# Patient Record
Sex: FEMALE | Race: BLACK OR AFRICAN AMERICAN | Employment: FULL TIME | URBAN - METROPOLITAN AREA
[De-identification: names, ages, dates, MRNs, and addresses within clinical notes are randomized per-mention and may not be internally consistent; named-entity substitution may affect disease eponyms.]

---

## 2017-05-18 PROBLEM — Z97.5 IUD (INTRAUTERINE DEVICE) IN PLACE: Status: ACTIVE | Noted: 2017-05-18

## 2017-05-18 PROBLEM — N83.201 RIGHT OVARIAN CYST: Status: ACTIVE | Noted: 2017-05-18

## 2017-11-10 ENCOUNTER — HOSPITAL ENCOUNTER (OUTPATIENT)
Dept: MRI IMAGING | Age: 22
Discharge: HOME | End: 2017-11-10
Attending: INTERNAL MEDICINE
Payer: COMMERCIAL

## 2017-11-10 DIAGNOSIS — G44.89 OTHER HEADACHE SYNDROME: ICD-10-CM

## 2017-11-10 PROCEDURE — A9577 INJ MULTIHANCE: HCPCS | Performed by: INTERNAL MEDICINE

## 2017-11-10 PROCEDURE — 74011250636 HC RX REV CODE- 250/636: Performed by: INTERNAL MEDICINE

## 2017-11-10 PROCEDURE — 70553 MRI BRAIN STEM W/O & W/DYE: CPT

## 2017-11-10 RX ORDER — SODIUM CHLORIDE 0.9 % (FLUSH) 0.9 %
10 SYRINGE (ML) INJECTION
Status: COMPLETED | OUTPATIENT
Start: 2017-11-10 | End: 2017-11-10

## 2017-11-10 RX ADMIN — GADOBENATE DIMEGLUMINE 14 ML: 529 INJECTION, SOLUTION INTRAVENOUS at 18:28

## 2017-11-10 RX ADMIN — Medication 10 ML: at 18:28

## 2018-05-21 PROBLEM — Z97.5 IUD (INTRAUTERINE DEVICE) IN PLACE: Status: RESOLVED | Noted: 2017-05-18 | Resolved: 2018-05-21

## 2018-05-21 PROCEDURE — 88142 CYTOPATH C/V THIN LAYER: CPT | Performed by: OBSTETRICS & GYNECOLOGY

## 2018-05-23 ENCOUNTER — HOSPITAL ENCOUNTER (OUTPATIENT)
Dept: LAB | Age: 23
Discharge: HOME | End: 2018-05-23

## 2018-07-18 PROBLEM — N83.201 RIGHT OVARIAN CYST: Status: RESOLVED | Noted: 2017-05-18 | Resolved: 2018-07-18

## 2018-08-23 ENCOUNTER — HOSPITAL ENCOUNTER (OUTPATIENT)
Dept: CT IMAGING | Age: 23
Discharge: HOME | End: 2018-08-23
Attending: INTERNAL MEDICINE
Payer: COMMERCIAL

## 2018-08-23 DIAGNOSIS — R53.83 FATIGUE, UNSPECIFIED TYPE: ICD-10-CM

## 2018-08-23 DIAGNOSIS — D69.6 THROMBOCYTOPENIA (HCC): ICD-10-CM

## 2018-08-23 PROBLEM — D50.0 IRON DEFICIENCY ANEMIA DUE TO CHRONIC BLOOD LOSS: Status: ACTIVE | Noted: 2018-08-23

## 2018-08-23 PROBLEM — N93.9 EXCESSIVE VAGINAL BLEEDING: Status: ACTIVE | Noted: 2018-08-23

## 2018-08-23 PROCEDURE — 74011000258 HC RX REV CODE- 258: Performed by: INTERNAL MEDICINE

## 2018-08-23 PROCEDURE — 74011636320 HC RX REV CODE- 636/320: Performed by: INTERNAL MEDICINE

## 2018-08-23 PROCEDURE — 74177 CT ABD & PELVIS W/CONTRAST: CPT

## 2018-08-23 RX ORDER — SODIUM CHLORIDE 0.9 % (FLUSH) 0.9 %
10 SYRINGE (ML) INJECTION
Status: COMPLETED | OUTPATIENT
Start: 2018-08-23 | End: 2018-08-23

## 2018-08-23 RX ADMIN — Medication 10 ML: at 09:50

## 2018-08-23 RX ADMIN — IOPAMIDOL 100 ML: 755 INJECTION, SOLUTION INTRAVENOUS at 09:50

## 2018-08-23 RX ADMIN — SODIUM CHLORIDE 100 ML: 900 INJECTION, SOLUTION INTRAVENOUS at 09:50

## 2018-08-23 RX ADMIN — DIATRIZOATE MEGLUMINE AND DIATRIZOATE SODIUM 15 ML: 660; 100 LIQUID ORAL; RECTAL at 09:50

## 2018-09-07 LAB
ALBUMIN SERPL-MCNC: 4.6 G/DL (ref 3.5–5)
ALBUMIN/GLOB SERPL: 1.2 {RATIO} (ref 1.2–3.5)
ALP SERPL-CCNC: 50 U/L (ref 50–136)
ALT SERPL-CCNC: 19 U/L (ref 12–65)
ANION GAP SERPL CALC-SCNC: 7 MMOL/L (ref 7–16)
AST SERPL-CCNC: 17 U/L (ref 15–37)
BASOPHILS # BLD: 0 K/UL (ref 0–0.2)
BASOPHILS NFR BLD: 0 % (ref 0–2)
BILIRUB SERPL-MCNC: 0.2 MG/DL (ref 0.2–1.1)
BUN SERPL-MCNC: 14 MG/DL (ref 6–23)
CALCIUM SERPL-MCNC: 9.9 MG/DL (ref 8.3–10.4)
CHLORIDE SERPL-SCNC: 104 MMOL/L (ref 98–107)
CO2 SERPL-SCNC: 29 MMOL/L (ref 21–32)
CREAT SERPL-MCNC: 0.96 MG/DL (ref 0.6–1)
DIFFERENTIAL METHOD BLD: ABNORMAL
EOSINOPHIL # BLD: 0.1 K/UL (ref 0–0.8)
EOSINOPHIL NFR BLD: 1 % (ref 0.5–7.8)
ERYTHROCYTE [DISTWIDTH] IN BLOOD BY AUTOMATED COUNT: 13.8 %
GLOBULIN SER CALC-MCNC: 3.8 G/DL (ref 2.3–3.5)
GLUCOSE SERPL-MCNC: 91 MG/DL (ref 65–100)
HCT VFR BLD AUTO: 41.2 % (ref 35.8–46.3)
HGB BLD-MCNC: 12.5 G/DL (ref 11.7–15.4)
IMM GRANULOCYTES # BLD: 0 K/UL (ref 0–0.5)
IMM GRANULOCYTES NFR BLD AUTO: 0 % (ref 0–5)
LYMPHOCYTES # BLD: 2.9 K/UL (ref 0.5–4.6)
LYMPHOCYTES NFR BLD: 35 % (ref 13–44)
MCH RBC QN AUTO: 23.7 PG (ref 26.1–32.9)
MCHC RBC AUTO-ENTMCNC: 30.3 G/DL (ref 31.4–35)
MCV RBC AUTO: 78 FL (ref 79.6–97.8)
MONOCYTES # BLD: 0.6 K/UL (ref 0.1–1.3)
MONOCYTES NFR BLD: 7 % (ref 4–12)
NEUTS SEG # BLD: 4.9 K/UL (ref 1.7–8.2)
NEUTS SEG NFR BLD: 58 % (ref 43–78)
NRBC # BLD: 0 K/UL (ref 0–0.2)
PLATELET # BLD AUTO: 136 K/UL (ref 150–450)
PMV BLD AUTO: 14 FL (ref 9.4–12.3)
POTASSIUM SERPL-SCNC: 4.2 MMOL/L (ref 3.5–5.1)
PROT SERPL-MCNC: 8.4 G/DL (ref 6.3–8.2)
RBC # BLD AUTO: 5.28 M/UL (ref 4.05–5.2)
SODIUM SERPL-SCNC: 140 MMOL/L (ref 136–145)
WBC # BLD AUTO: 8.5 K/UL (ref 4.3–11.1)

## 2018-09-07 PROCEDURE — 85025 COMPLETE CBC W/AUTO DIFF WBC: CPT

## 2018-09-07 PROCEDURE — 81025 URINE PREGNANCY TEST: CPT

## 2018-09-07 PROCEDURE — 93005 ELECTROCARDIOGRAM TRACING: CPT | Performed by: STUDENT IN AN ORGANIZED HEALTH CARE EDUCATION/TRAINING PROGRAM

## 2018-09-07 PROCEDURE — 81003 URINALYSIS AUTO W/O SCOPE: CPT | Performed by: EMERGENCY MEDICINE

## 2018-09-07 PROCEDURE — 99284 EMERGENCY DEPT VISIT MOD MDM: CPT | Performed by: EMERGENCY MEDICINE

## 2018-09-07 PROCEDURE — 80053 COMPREHEN METABOLIC PANEL: CPT

## 2018-09-08 ENCOUNTER — HOSPITAL ENCOUNTER (EMERGENCY)
Age: 23
Discharge: HOME | End: 2018-09-08
Attending: EMERGENCY MEDICINE
Payer: COMMERCIAL

## 2018-09-08 VITALS
OXYGEN SATURATION: 100 % | HEART RATE: 69 BPM | BODY MASS INDEX: 24.29 KG/M2 | SYSTOLIC BLOOD PRESSURE: 120 MMHG | TEMPERATURE: 98.3 F | DIASTOLIC BLOOD PRESSURE: 74 MMHG | RESPIRATION RATE: 12 BRPM | WEIGHT: 132 LBS | HEIGHT: 62 IN

## 2018-09-08 DIAGNOSIS — R42 DIZZINESS: Primary | ICD-10-CM

## 2018-09-08 LAB — HCG UR QL: NEGATIVE

## 2018-09-08 RX ORDER — MECLIZINE HYDROCHLORIDE 25 MG/1
25 TABLET ORAL
Qty: 30 TAB | Refills: 0 | Status: SHIPPED | OUTPATIENT
Start: 2018-09-08 | End: 2018-09-18

## 2018-09-08 NOTE — DISCHARGE INSTRUCTIONS
Dizziness: Care Instructions  Your Care Instructions  Dizziness is the feeling of unsteadiness or fuzziness in your head. It is different than having vertigo, which is a feeling that the room is spinning or that you are moving or falling. It is also different from lightheadedness, which is the feeling that you are about to faint. It can be hard to know what causes dizziness. Some people feel dizzy when they have migraine headaches. Sometimes bouts of flu can make you feel dizzy. Some medical conditions, such as heart problems or high blood pressure, can make you feel dizzy. Many medicines can cause dizziness, including medicines for high blood pressure, pain, or anxiety. If a medicine causes your symptoms, your doctor may recommend that you stop or change the medicine. If it is a problem with your heart, you may need medicine to help your heart work better. If there is no clear reason for your symptoms, your doctor may suggest watching and waiting for a while to see if the dizziness goes away on its own. Follow-up care is a key part of your treatment and safety. Be sure to make and go to all appointments, and call your doctor if you are having problems. It's also a good idea to know your test results and keep a list of the medicines you take. How can you care for yourself at home? · If your doctor recommends or prescribes medicine, take it exactly as directed. Call your doctor if you think you are having a problem with your medicine. · Do not drive while you feel dizzy. · Try to prevent falls. Steps you can take include:  ¨ Using nonskid mats, adding grab bars near the tub, and using night-lights. ¨ Clearing your home so that walkways are free of anything you might trip on. ¨ Letting family and friends know that you have been feeling dizzy. This will help them know how to help you. When should you call for help? Call 911 anytime you think you may need emergency care.  For example, call if:    · You passed out (lost consciousness).     · You have dizziness along with symptoms of a heart attack. These may include:  ¨ Chest pain or pressure, or a strange feeling in the chest.  ¨ Sweating. ¨ Shortness of breath. ¨ Nausea or vomiting. ¨ Pain, pressure, or a strange feeling in the back, neck, jaw, or upper belly or in one or both shoulders or arms. ¨ Lightheadedness or sudden weakness. ¨ A fast or irregular heartbeat.     · You have symptoms of a stroke. These may include:  ¨ Sudden numbness, tingling, weakness, or loss of movement in your face, arm, or leg, especially on only one side of your body. ¨ Sudden vision changes. ¨ Sudden trouble speaking. ¨ Sudden confusion or trouble understanding simple statements. ¨ Sudden problems with walking or balance. ¨ A sudden, severe headache that is different from past headaches.    Call your doctor now or seek immediate medical care if:    · You feel dizzy and have a fever, headache, or ringing in your ears.     · You have new or increased nausea and vomiting.     · Your dizziness does not go away or comes back.    Watch closely for changes in your health, and be sure to contact your doctor if:    · You do not get better as expected. Where can you learn more? Go to http://malick-kendra.info/. Enter C823 in the search box to learn more about \"Dizziness: Care Instructions. \"  Current as of: November 20, 2017  Content Version: 11.7  © 0501-5345 [employer]. Care instructions adapted under license by [employer] (which disclaims liability or warranty for this information). If you have questions about a medical condition or this instruction, always ask your healthcare professional. [place] [address] any warranty or liability for your use of this information.

## 2018-09-08 NOTE — ED PROVIDER NOTES
HPI Comments: 79-year-old white female presents to the emergency department complaining of vertiginous/vertigo symptoms over the last 2 months, often associated with movement, but also occasionally feeling out of it. States she had an MRI at the beginning of the year for headaches which was negative. Denies paralysis, paresthesias, possible bladder function. Denies headaches. Denies visual changes. Patient is a 24 y.o. female presenting with dizziness. The history is provided by the patient and a friend. Dizziness This is a recurrent problem. The current episode started more than 1 week ago. The problem has not changed since onset. There was no focality noted. Primary symptoms include loss of balance. Pertinent negatives include no focal weakness, no loss of sensation, no slurred speech, no speech difficulty, no memory loss, no movement disorder, no agitation, no visual change, no auditory change, no mental status change, no unresponsiveness and no disorientation. There has been no fever. Pertinent negatives include no shortness of breath, no chest pain, no vomiting, no altered mental status, no confusion, no headaches, no choking, no nausea, no bowel incontinence and no bladder incontinence. There were no medications administered prior to arrival. Associated medical issues do not include trauma, mood changes, bleeding disorder, seizures, dementia, CVA or clotting disorder. Past Medical History:  
Diagnosis Date  Anemia affecting pregnancy in second trimester 2015  Iron deficiency anemia  Mild or unspecified pre-eclampsia, antepartum 2015  Ovarian cyst   
 
 
Past Surgical History:  
Procedure Laterality Date  HX  SECTION  2015  HX WISDOM TEETH EXTRACTION Family History:  
Problem Relation Age of Onset  Other Mother   
  anemia  No Known Problems Father  No Known Problems Sister  No Known Problems Brother  Breast Cancer Maternal Aunt  Diabetes Maternal Aunt  No Known Problems Maternal Uncle  No Known Problems Paternal Aunt  No Known Problems Paternal Uncle  No Known Problems Maternal Grandmother  No Known Problems Maternal Grandfather  No Known Problems Paternal Grandmother  No Known Problems Paternal Grandfather Social History Social History  Marital status: SINGLE Spouse name: N/A  
 Number of children: N/A  
 Years of education: N/A Occupational History  Not on file. Social History Main Topics  Smoking status: Never Smoker  Smokeless tobacco: Never Used  Alcohol use No  
   Comment: occ  Drug use: No  
 Sexual activity: Yes  
  Partners: Male Birth control/ protection: None Other Topics Concern  Not on file Social History Narrative ALLERGIES: Review of patient's allergies indicates no known allergies. Review of Systems Constitutional: Negative for chills and fever. Respiratory: Negative for choking and shortness of breath. Cardiovascular: Negative for chest pain. Gastrointestinal: Negative for bowel incontinence, nausea and vomiting. Genitourinary: Negative for bladder incontinence. Neurological: Positive for dizziness and loss of balance. Negative for tremors, focal weakness, speech difficulty, weakness, light-headedness and headaches. Psychiatric/Behavioral: Negative for agitation, confusion and memory loss. All other systems reviewed and are negative. Vitals:  
 09/07/18 2252 BP: 123/72 Pulse: 67 Resp: 17 Temp: 98 °F (36.7 °C) SpO2: 100% Weight: 59.9 kg (132 lb) Height: 5' 2\" (1.575 m) Physical Exam  
Constitutional: She is oriented to person, place, and time. She appears well-developed and well-nourished. No distress. HENT:  
Head: Normocephalic and atraumatic.   
Right Ear: Tympanic membrane and external ear normal.  
 Left Ear: Tympanic membrane and external ear normal.  
Mouth/Throat: Oropharynx is clear and moist.  
Eyes: Conjunctivae and EOM are normal. Pupils are equal, round, and reactive to light. Neck: Normal range of motion. Neck supple. No tracheal deviation present. Cardiovascular: Normal rate, regular rhythm, normal heart sounds and intact distal pulses. Exam reveals no gallop and no friction rub. No murmur heard. Pulmonary/Chest: Effort normal and breath sounds normal. No respiratory distress. She has no wheezes. Abdominal: Soft. Bowel sounds are normal. She exhibits no distension and no mass. There is no hepatosplenomegaly. There is no tenderness. There is no rebound and no guarding. Musculoskeletal: Normal range of motion. She exhibits no edema. Lymphadenopathy:  
  She has no cervical adenopathy. Neurological: She is alert and oriented to person, place, and time. She has normal strength. She displays normal reflexes. No cranial nerve deficit or sensory deficit. Coordination and gait normal.  
Negative pronator drift, normal finger-nose. Negative DixHallpike Skin: Skin is warm and dry. No rash noted. She is not diaphoretic. No erythema. Psychiatric: She has a normal mood and affect. Her speech is normal.  
Nursing note and vitals reviewed. MDM Number of Diagnoses or Management Options Dizziness: new and requires workup Amount and/or Complexity of Data Reviewed Clinical lab tests: reviewed and ordered Review and summarize past medical records: yes Risk of Complications, Morbidity, and/or Mortality Presenting problems: moderate Diagnostic procedures: minimal 
Management options: moderate Patient Progress Patient progress: stable ED Course Procedures Results Reviewed: 
 
 
Recent Results (from the past 24 hour(s)) EKG, 12 LEAD, INITIAL Collection Time: 09/07/18 11:07 PM  
Result Value Ref Range  Ventricular Rate 68 BPM  
 Atrial Rate 68 BPM  
 P-R Interval 134 ms QRS Duration 80 ms  
 Q-T Interval 396 ms QTC Calculation (Bezet) 421 ms Calculated P Axis 25 degrees Calculated R Axis 80 degrees Calculated T Axis 61 degrees Diagnosis Normal sinus rhythm Normal ECG No previous ECGs available CBC WITH AUTOMATED DIFF Collection Time: 09/07/18 11:18 PM  
Result Value Ref Range WBC 8.5 4.3 - 11.1 K/uL  
 RBC 5.28 (H) 4.05 - 5.2 M/uL  
 HGB 12.5 11.7 - 15.4 g/dL HCT 41.2 35.8 - 46.3 % MCV 78.0 (L) 79.6 - 97.8 FL  
 MCH 23.7 (L) 26.1 - 32.9 PG  
 MCHC 30.3 (L) 31.4 - 35.0 g/dL  
 RDW 13.8 % PLATELET 220 (L) 158 - 450 K/uL MPV 14.0 (H) 9.4 - 12.3 FL ABSOLUTE NRBC 0.00 0.0 - 0.2 K/uL  
 DF AUTOMATED NEUTROPHILS 58 43 - 78 % LYMPHOCYTES 35 13 - 44 % MONOCYTES 7 4.0 - 12.0 % EOSINOPHILS 1 0.5 - 7.8 % BASOPHILS 0 0.0 - 2.0 % IMMATURE GRANULOCYTES 0 0.0 - 5.0 %  
 ABS. NEUTROPHILS 4.9 1.7 - 8.2 K/UL  
 ABS. LYMPHOCYTES 2.9 0.5 - 4.6 K/UL  
 ABS. MONOCYTES 0.6 0.1 - 1.3 K/UL  
 ABS. EOSINOPHILS 0.1 0.0 - 0.8 K/UL  
 ABS. BASOPHILS 0.0 0.0 - 0.2 K/UL  
 ABS. IMM. GRANS. 0.0 0.0 - 0.5 K/UL METABOLIC PANEL, COMPREHENSIVE Collection Time: 09/07/18 11:18 PM  
Result Value Ref Range Sodium 140 136 - 145 mmol/L Potassium 4.2 3.5 - 5.1 mmol/L Chloride 104 98 - 107 mmol/L  
 CO2 29 21 - 32 mmol/L Anion gap 7 7 - 16 mmol/L Glucose 91 65 - 100 mg/dL BUN 14 6 - 23 MG/DL Creatinine 0.96 0.6 - 1.0 MG/DL  
 GFR est AA >60 >60 ml/min/1.73m2 GFR est non-AA >60 >60 ml/min/1.73m2 Calcium 9.9 8.3 - 10.4 MG/DL Bilirubin, total 0.2 0.2 - 1.1 MG/DL  
 ALT (SGPT) 19 12 - 65 U/L  
 AST (SGOT) 17 15 - 37 U/L Alk. phosphatase 50 50 - 136 U/L Protein, total 8.4 (H) 6.3 - 8.2 g/dL Albumin 4.6 3.5 - 5.0 g/dL Globulin 3.8 (H) 2.3 - 3.5 g/dL A-G Ratio 1.2 1.2 - 3.5 HCG URINE, QL. - POC Collection Time: 09/07/18 11:58 PM  
Result Value Ref Range Pregnancy test,urine (POC) NEGATIVE  NEG    
 
 
I discussed the results of all labs, procedures, radiographs, and treatments with the patient and available family. Treatment plan is agreed upon and the patient is ready for discharge. All voiced understanding of the discharge plan and medication instructions or changes as appropriate. Questions about treatment in the ED were answered. All were encouraged to return should symptoms worsen or new problems develop.

## 2018-09-08 NOTE — ED TRIAGE NOTES
Pt complains of dizziness and lightheadedness. States Zuleyka I be dizzy and lightheaded every day, but it's not as bad as it is now. \" Reports history of iron deficiency and states she takes iron vitamins. Pt also complains of urinary frequency and diarrhea. States she takes OTC iron supplements instead of her prescribed iron pills because \"they make me sick. \"

## 2018-09-08 NOTE — ED NOTES
I have reviewed discharge instructions with the patient. The patient verbalized understanding. Patient left ED via Discharge Method: ambulatory to Home with family / friend. Opportunity for questions and clarification provided. Patient given 1 scripts. To continue your aftercare when you leave the hospital, you may receive an automated call from our care team to check in on how you are doing. This is a free service and part of our promise to provide the best care and service to meet your aftercare needs.  If you have questions, or wish to unsubscribe from this service please call [phone]. Thank you for Choosing our [facility] Emergency Department.

## 2018-09-09 LAB
ATRIAL RATE: 68 BPM
CALCULATED P AXIS, ECG09: 25 DEGREES
CALCULATED R AXIS, ECG10: 80 DEGREES
CALCULATED T AXIS, ECG11: 61 DEGREES
DIAGNOSIS, 93000: NORMAL
P-R INTERVAL, ECG05: 134 MS
Q-T INTERVAL, ECG07: 396 MS
QRS DURATION, ECG06: 80 MS
QTC CALCULATION (BEZET), ECG08: 421 MS
VENTRICULAR RATE, ECG03: 68 BPM

## 2018-11-06 ENCOUNTER — HOSPITAL ENCOUNTER (EMERGENCY)
Age: 23
Discharge: HOME | End: 2018-11-06
Attending: EMERGENCY MEDICINE
Payer: COMMERCIAL

## 2018-11-06 ENCOUNTER — APPOINTMENT (OUTPATIENT)
Dept: GENERAL RADIOLOGY | Age: 23
End: 2018-11-06
Attending: EMERGENCY MEDICINE
Payer: COMMERCIAL

## 2018-11-06 VITALS
HEART RATE: 76 BPM | HEIGHT: 62 IN | TEMPERATURE: 98.1 F | RESPIRATION RATE: 14 BRPM | WEIGHT: 141 LBS | OXYGEN SATURATION: 100 % | DIASTOLIC BLOOD PRESSURE: 55 MMHG | BODY MASS INDEX: 25.95 KG/M2 | SYSTOLIC BLOOD PRESSURE: 109 MMHG

## 2018-11-06 DIAGNOSIS — B34.9 VIRAL SYNDROME: Primary | ICD-10-CM

## 2018-11-06 DIAGNOSIS — R42 LIGHTHEADEDNESS: ICD-10-CM

## 2018-11-06 LAB
ALBUMIN SERPL-MCNC: 3.7 G/DL (ref 3.5–5)
ALBUMIN/GLOB SERPL: 1 {RATIO}
ALP SERPL-CCNC: 39 U/L (ref 50–136)
ALT SERPL-CCNC: 24 U/L (ref 12–65)
ANION GAP SERPL CALC-SCNC: 7 MMOL/L
AST SERPL-CCNC: 27 U/L (ref 15–37)
BASOPHILS # BLD: 0 K/UL (ref 0–0.2)
BASOPHILS NFR BLD: 1 % (ref 0–2)
BILIRUB SERPL-MCNC: 0.2 MG/DL (ref 0.2–1.1)
BUN SERPL-MCNC: 10 MG/DL (ref 6–23)
CALCIUM SERPL-MCNC: 8.8 MG/DL (ref 8.3–10.4)
CHLORIDE SERPL-SCNC: 106 MMOL/L (ref 98–107)
CO2 SERPL-SCNC: 26 MMOL/L (ref 21–32)
CREAT SERPL-MCNC: 0.97 MG/DL (ref 0.6–1)
DIFFERENTIAL METHOD BLD: ABNORMAL
EOSINOPHIL # BLD: 0.1 K/UL (ref 0–0.8)
EOSINOPHIL NFR BLD: 2 % (ref 0.5–7.8)
ERYTHROCYTE [DISTWIDTH] IN BLOOD BY AUTOMATED COUNT: 13.4 %
GLOBULIN SER CALC-MCNC: 3.6 G/DL (ref 2.3–3.5)
GLUCOSE SERPL-MCNC: 95 MG/DL (ref 65–100)
HCG UR QL: NEGATIVE
HCT VFR BLD AUTO: 40.6 % (ref 35.8–46.3)
HGB BLD-MCNC: 12.2 G/DL (ref 11.7–15.4)
IMM GRANULOCYTES # BLD: 0 K/UL (ref 0–0.5)
IMM GRANULOCYTES NFR BLD AUTO: 0 % (ref 0–5)
LYMPHOCYTES # BLD: 2.7 K/UL (ref 0.5–4.6)
LYMPHOCYTES NFR BLD: 36 % (ref 13–44)
MCH RBC QN AUTO: 23.6 PG (ref 26.1–32.9)
MCHC RBC AUTO-ENTMCNC: 30 G/DL (ref 31.4–35)
MCV RBC AUTO: 78.7 FL (ref 79.6–97.8)
MONOCYTES # BLD: 0.7 K/UL (ref 0.1–1.3)
MONOCYTES NFR BLD: 9 % (ref 4–12)
NEUTS SEG # BLD: 4 K/UL (ref 1.7–8.2)
NEUTS SEG NFR BLD: 53 % (ref 43–78)
NRBC # BLD: 0 K/UL (ref 0–0.2)
PLATELET # BLD AUTO: 148 K/UL (ref 150–450)
PMV BLD AUTO: 13.5 FL (ref 9.4–12.3)
POTASSIUM SERPL-SCNC: 3.8 MMOL/L (ref 3.5–5.1)
PROT SERPL-MCNC: 7.3 G/DL
RBC # BLD AUTO: 5.16 M/UL (ref 4.05–5.2)
SODIUM SERPL-SCNC: 139 MMOL/L (ref 136–145)
WBC # BLD AUTO: 7.5 K/UL (ref 4.3–11.1)

## 2018-11-06 PROCEDURE — 85025 COMPLETE CBC W/AUTO DIFF WBC: CPT

## 2018-11-06 PROCEDURE — 74011250636 HC RX REV CODE- 250/636: Performed by: EMERGENCY MEDICINE

## 2018-11-06 PROCEDURE — 80053 COMPREHEN METABOLIC PANEL: CPT

## 2018-11-06 PROCEDURE — 81003 URINALYSIS AUTO W/O SCOPE: CPT | Performed by: EMERGENCY MEDICINE

## 2018-11-06 PROCEDURE — 81025 URINE PREGNANCY TEST: CPT

## 2018-11-06 PROCEDURE — 96360 HYDRATION IV INFUSION INIT: CPT | Performed by: EMERGENCY MEDICINE

## 2018-11-06 PROCEDURE — 99284 EMERGENCY DEPT VISIT MOD MDM: CPT | Performed by: EMERGENCY MEDICINE

## 2018-11-06 PROCEDURE — 71046 X-RAY EXAM CHEST 2 VIEWS: CPT

## 2018-11-06 RX ADMIN — SODIUM CHLORIDE 1000 ML: 900 INJECTION, SOLUTION INTRAVENOUS at 07:04

## 2018-11-06 NOTE — DISCHARGE INSTRUCTIONS
Lightheadedness or Faintness: Care Instructions  Your Care Instructions  Lightheadedness is a feeling that you are about to faint or \"pass out. \" You do not feel as if you or your surroundings are moving. It is different from vertigo, which is the feeling that you or things around you are spinning or tilting. Lightheadedness usually goes away or gets better when you lie down. If lightheadedness gets worse, it can lead to a fainting spell. It is common to feel lightheaded from time to time. Lightheadedness usually is not caused by a serious problem. It often is caused by a short-lasting drop in blood pressure and blood flow to your head that occurs when you get up too quickly from a seated or lying position. Follow-up care is a key part of your treatment and safety. Be sure to make and go to all appointments, and call your doctor if you are having problems. It's also a good idea to know your test results and keep a list of the medicines you take. How can you care for yourself at home? · Lie down for 1 or 2 minutes when you feel lightheaded. After lying down, sit up slowly and remain sitting for 1 to 2 minutes before slowly standing up. · Avoid movements, positions, or activities that have made you lightheaded in the past.  · Get plenty of rest, especially if you have a cold or flu, which can cause lightheadedness. · Make sure you drink plenty of fluids, especially if you have a fever or have been sweating. · Do not drive or put yourself and others in danger while you feel lightheaded. When should you call for help? Call 911 anytime you think you may need emergency care. For example, call if:    · You have symptoms of a stroke. These may include:  ? Sudden numbness, tingling, weakness, or loss of movement in your face, arm, or leg, especially on only one side of your body. ? Sudden vision changes. ? Sudden trouble speaking. ? Sudden confusion or trouble understanding simple statements.   ? Sudden problems with walking or balance. ? A sudden, severe headache that is different from past headaches.     · You have symptoms of a heart attack. These may include:  ? Chest pain or pressure, or a strange feeling in the chest.  ? Sweating. ? Shortness of breath. ? Nausea or vomiting. ? Pain, pressure, or a strange feeling in the back, neck, jaw, or upper belly or in one or both shoulders or arms. ? Lightheadedness or sudden weakness. ? A fast or irregular heartbeat. After you call 911, the  may tell you to chew 1 adult-strength or 2 to 4 low-dose aspirin. Wait for an ambulance. Do not try to drive yourself.    Watch closely for changes in your health, and be sure to contact your doctor if:    · Your lightheadedness gets worse or does not get better with home care. Where can you learn more? Go to http://malick-kendra.info/. Enter K521 in the search box to learn more about \"Lightheadedness or Faintness: Care Instructions. \"  Current as of: November 20, 2017  Content Version: 11.8  © 7684-8447 [employer]. Care instructions adapted under license by [employer] (which disclaims liability or warranty for this information). If you have questions about a medical condition or this instruction, always ask your healthcare professional. [address] any warranty or liability for your use of this information. Viral Infections: Care Instructions  Your Care Instructions    You don't feel well, but it's not clear what's causing it. You may have a viral infection. Viruses cause many illnesses, such as the common cold, influenza, fever, rashes, and the diarrhea, nausea, and vomiting that are often called \"stomach flu. \" You may wonder if antibiotic medicines could make you feel better. But antibiotics only treat infections caused by bacteria. They don't work on viruses. The good news is that viral infections usually aren't serious.  Most will go away in a few days without medical treatment. In the meantime, there are a few things you can do to make yourself more comfortable. Follow-up care is a key part of your treatment and safety. Be sure to make and go to all appointments, and call your doctor if you are having problems. It's also a good idea to know your test results and keep a list of the medicines you take. How can you care for yourself at home? · Get plenty of rest if you feel tired. · Take an over-the-counter pain medicine if needed, such as acetaminophen (Tylenol), ibuprofen (Advil, Motrin), or naproxen (Aleve). Read and follow all instructions on the label. · Be careful when taking over-the-counter cold or flu medicines and Tylenol at the same time. Many of these medicines have acetaminophen, which is Tylenol. Read the labels to make sure that you are not taking more than the recommended dose. Too much acetaminophen (Tylenol) can be harmful. · Drink plenty of fluids, enough so that your urine is light yellow or clear like water. If you have kidney, heart, or liver disease and have to limit fluids, talk with your doctor before you increase the amount of fluids you drink. · Stay home from work, school, and other public places while you have a fever. When should you call for help? Call 911 anytime you think you may need emergency care. For example, call if:    · You have severe trouble breathing.     · You passed out (lost consciousness).    Call your doctor now or seek immediate medical care if:    · You seem to be getting much sicker.     · You have a new or higher fever.     · You have blood in your stools.     · You have new belly pain, or your pain gets worse.     · You have a new rash.    Watch closely for changes in your health, and be sure to contact your doctor if:    · You start to get better and then get worse.     · You do not get better as expected. Where can you learn more?   Go to http://malick-kendra.info/. Enter Y497 in the search box to learn more about \"Viral Infections: Care Instructions. \"  Current as of: November 18, 2017  Content Version: 11.8  © 8448-7780 [employer], [facility]. Care instructions adapted under license by [employer] (which disclaims liability or warranty for this information). If you have questions about a medical condition or this instruction, always ask your healthcare professional. [place] [address] any warranty or liability for your use of this information.

## 2018-11-06 NOTE — ED PROVIDER NOTES
Patient presents to prominent in with complaints of lightheadedness cough and congestion. She states she's been ill for about the past 5 days was seen at urgent care 5 days ago and was started on an antibiotic. she's been feeling lightheaded for the past 3 days. She denies fever or chills she denies any vomiting she does report 2-3 episodes of diarrhea over the past 24 hours. The history is provided by the patient. Dizziness This is a new problem. The current episode started more than 2 days ago. The problem has not changed since onset. There was no focality noted. There has been no fever. Pertinent negatives include no vomiting, no headaches and no nausea. There were no medications administered prior to arrival.  
  
 
Past Medical History:  
Diagnosis Date  Anemia affecting pregnancy in second trimester 2015  Iron deficiency anemia  Mild or unspecified pre-eclampsia, antepartum 2015  Ovarian cyst   
 
 
Past Surgical History:  
Procedure Laterality Date  HX  SECTION  2015  HX WISDOM TEETH EXTRACTION Family History:  
Problem Relation Age of Onset  Other Mother   
     anemia  No Known Problems Father  No Known Problems Sister  No Known Problems Brother  Breast Cancer Maternal Aunt  Diabetes Maternal Aunt  No Known Problems Maternal Uncle  No Known Problems Paternal Aunt  No Known Problems Paternal Uncle  No Known Problems Maternal Grandmother  No Known Problems Maternal Grandfather  No Known Problems Paternal Grandmother  No Known Problems Paternal Grandfather Social History Socioeconomic History  Marital status: SINGLE Spouse name: Not on file  Number of children: Not on file  Years of education: Not on file  Highest education level: Not on file Social Needs  Financial resource strain: Not on file  Food insecurity - worry: Not on file  Food insecurity - inability: Not on file  Transportation needs - medical: Not on file  Transportation needs - non-medical: Not on file Occupational History  Not on file Tobacco Use  Smoking status: Never Smoker  Smokeless tobacco: Never Used Substance and Sexual Activity  Alcohol use: No  
  Comment: occ  Drug use: No  
 Sexual activity: Yes  
  Partners: Male Birth control/protection: None Other Topics Concern  Not on file Social History Narrative  Not on file ALLERGIES: Patient has no known allergies. Review of Systems Gastrointestinal: Negative for nausea and vomiting. Neurological: Positive for dizziness. Negative for headaches. All other systems reviewed and are negative. Vitals:  
 11/06/18 1930 BP: 132/67 Pulse: 74 Resp: 16 Temp: 98.3 °F (36.8 °C) SpO2: 100% Weight: 64 kg (141 lb) Height: 5' 2\" (1.575 m) Physical Exam  
Constitutional: She is oriented to person, place, and time. She appears well-developed and well-nourished. HENT:  
Head: Normocephalic and atraumatic. Eyes: Conjunctivae and EOM are normal. Pupils are equal, round, and reactive to light. Neck: Normal range of motion. Neck supple. Cardiovascular: Normal rate and regular rhythm. Pulmonary/Chest: Effort normal and breath sounds normal.  
Abdominal: Soft. Bowel sounds are normal.  
Musculoskeletal: Normal range of motion. Neurological: She is alert and oriented to person, place, and time. Skin: Skin is warm and dry. Psychiatric: She has a normal mood and affect. Her behavior is normal.  
Nursing note and vitals reviewed. MDM Number of Diagnoses or Management Options Amount and/or Complexity of Data Reviewed Clinical lab tests: ordered and reviewed Tests in the radiology section of CPT®: ordered and reviewed Risk of Complications, Morbidity, and/or Mortality Presenting problems: moderate Diagnostic procedures: moderate Management options: moderate Patient Progress Patient progress: stable Procedures

## 2018-11-06 NOTE — ED NOTES
I have reviewed discharge instructions with the patient. The patient verbalized understanding. Patient left ED via Discharge Method: ambulatory to Home with significant other. Opportunity for questions and clarification provided. Patient given 0 scripts. To continue your aftercare when you leave the hospital, you may receive an automated call from our care team to check in on how you are doing. This is a free service and part of our promise to provide the best care and service to meet your aftercare needs.  If you have questions, or wish to unsubscribe from this service please call [phone]. Thank you for Choosing our [facility] Emergency Department.

## 2018-11-16 PROBLEM — Z30.09 FAMILY PLANNING EDUCATION, GUIDANCE, AND COUNSELING: Status: ACTIVE | Noted: 2018-11-16

## 2018-11-26 PROBLEM — N93.8 DUB (DYSFUNCTIONAL UTERINE BLEEDING): Status: ACTIVE | Noted: 2018-11-26

## 2019-02-03 ENCOUNTER — HOSPITAL ENCOUNTER (EMERGENCY)
Age: 24
Discharge: HOME | End: 2019-02-03
Attending: EMERGENCY MEDICINE | Admitting: EMERGENCY MEDICINE
Payer: COMMERCIAL

## 2019-02-03 VITALS
RESPIRATION RATE: 18 BRPM | DIASTOLIC BLOOD PRESSURE: 65 MMHG | SYSTOLIC BLOOD PRESSURE: 107 MMHG | TEMPERATURE: 98.4 F | OXYGEN SATURATION: 99 % | HEART RATE: 65 BPM

## 2019-02-03 DIAGNOSIS — J06.9 VIRAL URI WITH COUGH: Primary | ICD-10-CM

## 2019-02-03 DIAGNOSIS — J02.9 ACUTE PHARYNGITIS, UNSPECIFIED ETIOLOGY: ICD-10-CM

## 2019-02-03 PROCEDURE — 99283 EMERGENCY DEPT VISIT LOW MDM: CPT | Performed by: EMERGENCY MEDICINE

## 2019-02-03 RX ORDER — ALBUTEROL SULFATE 90 UG/1
2 AEROSOL, METERED RESPIRATORY (INHALATION)
Qty: 1 INHALER | Refills: 0 | Status: SHIPPED | OUTPATIENT
Start: 2019-02-03 | End: 2019-02-27

## 2019-02-03 NOTE — ED NOTES
Pt c/o congention, cough, sore throat. Was given script for abx and cough medicine but not feeling any better.

## 2019-02-03 NOTE — DISCHARGE INSTRUCTIONS
Patient Education   continue current medications. Also begin to take over-the-counter Mucinex twice daily for 5-7 days. Over-the-counter Griffin's cough drops or cough. Albuterol inhaler 2 puffs every 4 hours as needed for cough. Follow-up with your doctor in 5-7 days if not improving. Cough may persist for several weeks. Upper Respiratory Infection (Cold): Care Instructions  Your Care Instructions    An upper respiratory infection, or URI, is an infection of the nose, sinuses, or throat. URIs are spread by coughs, sneezes, and direct contact. The common cold is the most frequent kind of URI. The flu and sinus infections are other kinds of URIs. Almost all URIs are caused by viruses. Antibiotics won't cure them. But you can treat most infections with home care. This may include drinking lots of fluids and taking over-the-counter pain medicine. You will probably feel better in 4 to 10 days. The doctor has checked you carefully, but problems can develop later. If you notice any problems or new symptoms, get medical treatment right away. Follow-up care is a key part of your treatment and safety. Be sure to make and go to all appointments, and call your doctor if you are having problems. It's also a good idea to know your test results and keep a list of the medicines you take. How can you care for yourself at home? · To prevent dehydration, drink plenty of fluids, enough so that your urine is light yellow or clear like water. Choose water and other caffeine-free clear liquids until you feel better. If you have kidney, heart, or liver disease and have to limit fluids, talk with your doctor before you increase the amount of fluids you drink. · Take an over-the-counter pain medicine, such as acetaminophen (Tylenol), ibuprofen (Advil, Motrin), or naproxen (Aleve). Read and follow all instructions on the label. · Before you use cough and cold medicines, check the label.  These medicines may not be safe for young children or for people with certain health problems. · Be careful when taking over-the-counter cold or flu medicines and Tylenol at the same time. Many of these medicines have acetaminophen, which is Tylenol. Read the labels to make sure that you are not taking more than the recommended dose. Too much acetaminophen (Tylenol) can be harmful. · Get plenty of rest.  · Do not smoke or allow others to smoke around you. If you need help quitting, talk to your doctor about stop-smoking programs and medicines. These can increase your chances of quitting for good. When should you call for help? Call 911 anytime you think you may need emergency care. For example, call if:    · You have severe trouble breathing.    Call your doctor now or seek immediate medical care if:    · You seem to be getting much sicker.     · You have new or worse trouble breathing.     · You have a new or higher fever.     · You have a new rash.    Watch closely for changes in your health, and be sure to contact your doctor if:    · You have a new symptom, such as a sore throat, an earache, or sinus pain.     · You cough more deeply or more often, especially if you notice more mucus or a change in the color of your mucus.     · You do not get better as expected. Where can you learn more? Go to http://malick-kendra.info/. Enter W859 in the search box to learn more about \"Upper Respiratory Infection (Cold): Care Instructions. \"  Current as of: September 5, 2018  Content Version: 11.9  © 2318-8241 [employer]. Care instructions adapted under license by [employer] (which disclaims liability or warranty for this information). If you have questions about a medical condition or this instruction, always ask your healthcare professional. [place] [address] any warranty or liability for your use of this information.

## 2019-02-03 NOTE — ED NOTES
I have reviewed discharge instructions with the patient. The patient verbalized understanding. Patient left ED via Discharge Method: ambulatory to Home with family. Opportunity for questions and clarification provided. Patient given 1 script. To continue your aftercare when you leave the hospital, you may receive an automated call from our care team to check in on how you are doing. This is a free service and part of our promise to provide the best care and service to meet your aftercare needs.  If you have questions, or wish to unsubscribe from this service please call [phone]. Thank you for Choosing our [facility] Emergency Department.

## 2019-02-03 NOTE — ED PROVIDER NOTES
Sore throat cough cold and congestion unrelieved with antibiotic and cough medication. Onset several days ago. No fever. No shortness of breath. She is having some difficulty getting up phlegm. The history is provided by the patient. Cough Associated symptoms include rhinorrhea and sore throat. Pertinent negatives include no chills and no vomiting. Past Medical History:  
Diagnosis Date  Anemia affecting pregnancy in second trimester 2015  Iron deficiency anemia  Mild or unspecified pre-eclampsia, antepartum 2015  Ovarian cyst   
 
 
Past Surgical History:  
Procedure Laterality Date  HX  SECTION  2015  HX WISDOM TEETH EXTRACTION Family History:  
Problem Relation Age of Onset  Other Mother   
     anemia  No Known Problems Father  No Known Problems Sister  No Known Problems Brother  Breast Cancer Maternal Aunt  Diabetes Maternal Aunt  No Known Problems Maternal Uncle  No Known Problems Paternal Aunt  No Known Problems Paternal Uncle  No Known Problems Maternal Grandmother  No Known Problems Maternal Grandfather  No Known Problems Paternal Grandmother  No Known Problems Paternal Grandfather Social History Socioeconomic History  Marital status: SINGLE Spouse name: Not on file  Number of children: Not on file  Years of education: Not on file  Highest education level: Not on file Social Needs  Financial resource strain: Not on file  Food insecurity - worry: Not on file  Food insecurity - inability: Not on file  Transportation needs - medical: Not on file  Transportation needs - non-medical: Not on file Occupational History  Not on file Tobacco Use  Smoking status: Never Smoker  Smokeless tobacco: Never Used Substance and Sexual Activity  Alcohol use: No  
  Comment: occ  Drug use: No  
 Sexual activity: Yes  
  Partners: Male Birth control/protection: None Other Topics Concern  Not on file Social History Narrative  Not on file ALLERGIES: Patient has no known allergies. Review of Systems Constitutional: Negative for chills and fever. HENT: Positive for congestion, rhinorrhea and sore throat. Respiratory: Positive for cough. Gastrointestinal: Negative for vomiting. There were no vitals filed for this visit. Physical Exam  
Constitutional: She appears well-developed and well-nourished. HENT:  
Right Ear: External ear normal.  
Left Ear: External ear normal.  
Oropharynx injected, uvula midline Neck: Normal range of motion. Neck supple. Cardiovascular: Normal rate, regular rhythm and normal heart sounds. Pulmonary/Chest: Effort normal and breath sounds normal. No respiratory distress. She has no wheezes. She has no rales. Musculoskeletal: Normal range of motion. She exhibits no edema or tenderness. Lymphadenopathy:  
  She has cervical adenopathy. Nursing note and vitals reviewed. MDM Number of Diagnoses or Management Options Diagnosis management comments: Diaz Level next current regimen. Salt water gargles. Taking ibuprofen but told alternate every 3-4 hours with Tylenol. Nontoxic appearing. Lungs are clear to auscultation bilaterally. Current antibiotic would cover pneumonia or strep throat. Likely has viral syndrome. Procedures

## 2019-02-05 ENCOUNTER — HOSPITAL ENCOUNTER (EMERGENCY)
Age: 24
Discharge: HOME | End: 2019-02-05
Attending: EMERGENCY MEDICINE
Payer: COMMERCIAL

## 2019-02-05 VITALS
BODY MASS INDEX: 27.79 KG/M2 | SYSTOLIC BLOOD PRESSURE: 118 MMHG | DIASTOLIC BLOOD PRESSURE: 76 MMHG | RESPIRATION RATE: 18 BRPM | OXYGEN SATURATION: 99 % | WEIGHT: 151 LBS | HEIGHT: 62 IN | TEMPERATURE: 98.4 F | HEART RATE: 86 BPM

## 2019-02-05 DIAGNOSIS — J02.9 ACUTE PHARYNGITIS, UNSPECIFIED ETIOLOGY: Primary | ICD-10-CM

## 2019-02-05 LAB — DEPRECATED S PYO AG THROAT QL EIA: NEGATIVE

## 2019-02-05 PROCEDURE — 99283 EMERGENCY DEPT VISIT LOW MDM: CPT | Performed by: EMERGENCY MEDICINE

## 2019-02-05 PROCEDURE — 74011250637 HC RX REV CODE- 250/637: Performed by: EMERGENCY MEDICINE

## 2019-02-05 PROCEDURE — 87880 STREP A ASSAY W/OPTIC: CPT

## 2019-02-05 PROCEDURE — 87081 CULTURE SCREEN ONLY: CPT

## 2019-02-05 RX ORDER — DEXAMETHASONE SODIUM PHOSPHATE 4 MG/ML
14 INJECTION, SOLUTION INTRA-ARTICULAR; INTRALESIONAL; INTRAMUSCULAR; INTRAVENOUS; SOFT TISSUE
Status: COMPLETED | OUTPATIENT
Start: 2019-02-05 | End: 2019-02-05

## 2019-02-05 RX ORDER — DEXAMETHASONE SODIUM PHOSPHATE 100 MG/10ML
14 INJECTION INTRAMUSCULAR; INTRAVENOUS
Status: DISCONTINUED | OUTPATIENT
Start: 2019-02-05 | End: 2019-02-05 | Stop reason: SDUPTHER

## 2019-02-05 RX ADMIN — DEXAMETHASONE SODIUM PHOSPHATE 14 MG: 4 INJECTION, SOLUTION INTRAMUSCULAR; INTRAVENOUS at 22:33

## 2019-02-06 NOTE — DISCHARGE INSTRUCTIONS
Drink plenty of fluid. Use Magic mouthwash twice a day. Alternate Tylenol and Motrin. Use Sambucol 4 tablespoon daily x 5 days   Follow-up with your doctor for checkup.

## 2019-02-06 NOTE — ED PROVIDER NOTES
HPI: 
24 F, here with sore throat. Was seen 1 week ago with PCP for URI. Was started on Ceftin which she has been taken for the past 6 days. Has not had improvement in her sore throat. Congestion is the same. She denies any headaches. Denies any fever, abdominal pain, nausea vomiting or diarrhea. ROS Constitutional: No fever, no chills Skin: no rash Eye: No vision changes ENMT: + sore throat Respiratory: No shortness of breath, + cough Cardiovascular: No chest pain, no palpitations Gastrointestinal: No vomiting, no nausea, no diarrhea, no abdominal pain : No dysuria MSK: No back pain, no muscle pain, no joint pain Neuro: No headache, no change in mental status, no numbness, no tingling, no weakness Psych:  
Endocrine:  
All other review of systems positive per history of present illness and the above otherwise negative or noncontributory. Visit Vitals /76 (BP 1 Location: Left arm) Pulse 86 Temp 98.4 °F (36.9 °C) Resp 18 Ht 5' 2\" (1.575 m) Wt 68.5 kg (151 lb) LMP 2019 SpO2 99% BMI 27.62 kg/m² Past Medical History:  
Diagnosis Date  Anemia affecting pregnancy in second trimester 2015  Iron deficiency anemia  Mild or unspecified pre-eclampsia, antepartum 2015  Ovarian cyst   
 
Past Surgical History:  
Procedure Laterality Date  HX  SECTION  2015  HX WISDOM TEETH EXTRACTION Prior to Admission Medications Prescriptions Last Dose Informant Patient Reported? Taking? albuterol (PROVENTIL HFA, VENTOLIN HFA, PROAIR HFA) 90 mcg/actuation inhaler   No No  
Sig: Take 2 Puffs by inhalation every four (4) hours as needed (for cough, use with spacer chamber please). brompheniramine-pseudoeph-DM (BROMFED DM) 2-30-10 mg/5 mL syrup   No No  
Sig: Take 5 mL by mouth four (4) times daily as needed. cefdinir (OMNICEF) 300 mg capsule   No No  
Sig: Take 1 Cap by mouth two (2) times a day. ferrous sulfate (SLOW FE) 142 mg (45 mg iron) ER tablet   Yes No  
Sig: Take 142 mg by mouth Daily (before breakfast). montelukast (SINGULAIR) 10 mg tablet   No No  
Sig: Take 1 Tab by mouth daily. Facility-Administered Medications: None Adult Exam  
General: alert, no acute distress Head: normocephalic, atraumatic ENT: moist mucous membranes Posterior pharynx without any edema, exudate. No lingual, sublingual swelling. Trachea is midline. No tenderness to palpation of the hyoid bone. TM without otitis media Neck: supple, non-tender; full range of motion Cardiovascular: regular rate and rhythm, normal peripheral perfusion, no edema Respiratory:  normal respirations; no wheezing, rales or rhonchi Gastrointestinal: soft, non-tender; no rebound or guarding, no peritoneal signs, no distension Back: non-tender, full range of motion Musculoskeletal: normal range of motion, normal strength, no gross deformities Neurological: alert and oriented x 4, no gross focal deficits; normal speech Psychiatric: cooperative; appropriate mood and affect MDM: strep swab obtained from triage is negative. She is otherwise nontoxic. We'll give a dose of oral Decadron here. Recommended Magic mouthwash. Do not suspect deep space infection such as retropharyngeal abscess, epiglottitis. No stridor, difficulty breathing. Do not suspect airway compromise. Lungs are clear do not suspect pneumonia. Likely viral etiology. We'll discharge home. No results found. Recent Results (from the past 24 hour(s)) STREP AG SCREEN, GROUP A Collection Time: 02/05/19  9:46 PM  
Result Value Ref Range Group A Strep Ag ID NEGATIVE  NEG Dragon voice recognition software was used to create this note. Although the note has been reviewed and corrected where necessary, additional errors may have been overlooked and remain in the text.

## 2019-02-08 LAB
BACTERIA SPEC CULT: NORMAL
SERVICE CMNT-IMP: NORMAL

## 2019-04-10 ENCOUNTER — HOSPITAL ENCOUNTER (EMERGENCY)
Age: 24
Discharge: HOME | End: 2019-04-10
Attending: EMERGENCY MEDICINE
Payer: COMMERCIAL

## 2019-04-10 VITALS
HEART RATE: 74 BPM | DIASTOLIC BLOOD PRESSURE: 84 MMHG | RESPIRATION RATE: 16 BRPM | OXYGEN SATURATION: 100 % | TEMPERATURE: 98.1 F | HEIGHT: 62 IN | WEIGHT: 156 LBS | SYSTOLIC BLOOD PRESSURE: 136 MMHG | BODY MASS INDEX: 28.71 KG/M2

## 2019-04-10 DIAGNOSIS — R04.0 EPISTAXIS: Primary | ICD-10-CM

## 2019-04-10 PROCEDURE — 99283 EMERGENCY DEPT VISIT LOW MDM: CPT | Performed by: NURSE PRACTITIONER

## 2019-04-10 NOTE — ED PROVIDER NOTES
Patient states epistaxis that resolved prior to arrival to the ED. She states she applied direct pressure and bleeding resolved about 30 minutes later. She denies trauma. She states she has been taking Claritin for seasonal allergies. No bleeding at this time. The history is provided by the patient. Epistaxis This is a new problem. The current episode started 1 to 2 hours ago. The problem occurs rarely. The problem has been resolved. The problem is associated with nothing. The bleeding has been from both nares. She has tried applying pressure for the symptoms. The treatment provided significant relief. Her past medical history is significant for allergies. Past Medical History:  
Diagnosis Date  Anemia affecting pregnancy in second trimester 2015  Iron deficiency anemia  Mild or unspecified pre-eclampsia, antepartum 2015  Ovarian cyst   
 
 
Past Surgical History:  
Procedure Laterality Date  HX  SECTION  2015  HX WISDOM TEETH EXTRACTION Family History:  
Problem Relation Age of Onset  Other Mother   
     anemia  No Known Problems Father  No Known Problems Sister  No Known Problems Brother  Breast Cancer Maternal Aunt  Diabetes Maternal Aunt  No Known Problems Maternal Uncle  No Known Problems Paternal Aunt  No Known Problems Paternal Uncle  No Known Problems Maternal Grandmother  No Known Problems Maternal Grandfather  No Known Problems Paternal Grandmother  No Known Problems Paternal Grandfather Social History Socioeconomic History  Marital status: SINGLE Spouse name: Not on file  Number of children: Not on file  Years of education: Not on file  Highest education level: Not on file Occupational History  Not on file Social Needs  Financial resource strain: Not on file  Food insecurity:  
  Worry: Not on file Inability: Not on file  Transportation needs:  
  Medical: Not on file Non-medical: Not on file Tobacco Use  Smoking status: Never Smoker  Smokeless tobacco: Never Used Substance and Sexual Activity  Alcohol use: No  
  Comment: occ  Drug use: No  
 Sexual activity: Yes  
  Partners: Male Birth control/protection: None Lifestyle  Physical activity:  
  Days per week: Not on file Minutes per session: Not on file  Stress: Not on file Relationships  Social connections:  
  Talks on phone: Not on file Gets together: Not on file Attends Tenriism service: Not on file Active member of club or organization: Not on file Attends meetings of clubs or organizations: Not on file Relationship status: Not on file  Intimate partner violence:  
  Fear of current or ex partner: Not on file Emotionally abused: Not on file Physically abused: Not on file Forced sexual activity: Not on file Other Topics Concern  Not on file Social History Narrative  Not on file ALLERGIES: Patient has no known allergies. Review of Systems HENT: Positive for nosebleeds. Vitals:  
 04/10/19 1503 BP: 134/80 Pulse: 73 Resp: 16 Temp: 99 °F (37.2 °C) SpO2: 100% Weight: 70.8 kg (156 lb) Height: 5' 2\" (1.575 m) Physical Exam  
Constitutional: She is oriented to person, place, and time. She appears well-developed and well-nourished. No distress. HENT:  
Head: Atraumatic. Right Ear: Tympanic membrane is erythematous. Tympanic membrane is not injected. A middle ear effusion is present. Left Ear: Tympanic membrane is erythematous. Tympanic membrane is not injected. A middle ear effusion is present. Nose: Mucosal edema present. No epistaxis. No foreign bodies. Right sinus exhibits no maxillary sinus tenderness and no frontal sinus tenderness. Left sinus exhibits no maxillary sinus tenderness and no frontal sinus tenderness. Eyes: Conjunctivae and EOM are normal.  
Neck: Normal range of motion. Neck supple. Cardiovascular: Normal rate and regular rhythm. Pulmonary/Chest: Effort normal and breath sounds normal.  
Abdominal: She exhibits no distension. Neurological: She is alert and oriented to person, place, and time. Skin: Skin is warm and dry. She is not diaphoretic. Psychiatric: She has a normal mood and affect. Her behavior is normal.  
Nursing note and vitals reviewed. Discussed checking CBC with patient due to length of bleeding. She declined at this time. I feel this is an appropriate decision given no additional signs of anemia at this time. MDM Number of Diagnoses or Management Options Epistaxis: new and does not require workup Risk of Complications, Morbidity, and/or Mortality Presenting problems: low Diagnostic procedures: low Management options: low Patient Progress Patient progress: stable Procedures

## 2019-04-10 NOTE — DISCHARGE INSTRUCTIONS
Over the counter saline spray to help prevent nosebleeds. Follow up with your primary care provider for a recheck if symptoms fail to improve  Return to the Emergency Department for any new or worse symptoms.

## 2019-04-10 NOTE — ED TRIAGE NOTES
Pt in states she had nose bleed with large clots about 10 minutes. No bleeding on triage. Denies injury.

## 2019-04-10 NOTE — ED NOTES
I have reviewed discharge instructions with the patient. The patient verbalized understanding. Patient left ED via Discharge Method: ambulatory to Home with self. Opportunity for questions and clarification provided. Patient given 0 scripts. To continue your aftercare when you leave the hospital, you may receive an automated call from our care team to check in on how you are doing. This is a free service and part of our promise to provide the best care and service to meet your aftercare needs.  If you have questions, or wish to unsubscribe from this service please call [phone]. Thank you for Choosing our [facility] Emergency Department.

## 2019-08-19 ENCOUNTER — HOSPITAL ENCOUNTER (EMERGENCY)
Age: 24
Discharge: HOME | End: 2019-08-19
Attending: EMERGENCY MEDICINE
Payer: COMMERCIAL

## 2019-08-19 VITALS
HEART RATE: 61 BPM | SYSTOLIC BLOOD PRESSURE: 127 MMHG | OXYGEN SATURATION: 99 % | WEIGHT: 154 LBS | HEIGHT: 62 IN | TEMPERATURE: 98.6 F | BODY MASS INDEX: 28.34 KG/M2 | RESPIRATION RATE: 16 BRPM | DIASTOLIC BLOOD PRESSURE: 68 MMHG

## 2019-08-19 DIAGNOSIS — R51.9 NONINTRACTABLE HEADACHE, UNSPECIFIED CHRONICITY PATTERN, UNSPECIFIED HEADACHE TYPE: Primary | ICD-10-CM

## 2019-08-19 PROCEDURE — 96375 TX/PRO/DX INJ NEW DRUG ADDON: CPT | Performed by: EMERGENCY MEDICINE

## 2019-08-19 PROCEDURE — 74011250636 HC RX REV CODE- 250/636: Performed by: EMERGENCY MEDICINE

## 2019-08-19 PROCEDURE — 96374 THER/PROPH/DIAG INJ IV PUSH: CPT | Performed by: EMERGENCY MEDICINE

## 2019-08-19 PROCEDURE — 99284 EMERGENCY DEPT VISIT MOD MDM: CPT | Performed by: EMERGENCY MEDICINE

## 2019-08-19 PROCEDURE — 74011000250 HC RX REV CODE- 250: Performed by: EMERGENCY MEDICINE

## 2019-08-19 RX ORDER — DIPHENHYDRAMINE HYDROCHLORIDE 50 MG/ML
25 INJECTION, SOLUTION INTRAMUSCULAR; INTRAVENOUS
Status: COMPLETED | OUTPATIENT
Start: 2019-08-19 | End: 2019-08-19

## 2019-08-19 RX ORDER — KETOROLAC TROMETHAMINE 30 MG/ML
30 INJECTION, SOLUTION INTRAMUSCULAR; INTRAVENOUS
Status: COMPLETED | OUTPATIENT
Start: 2019-08-19 | End: 2019-08-19

## 2019-08-19 RX ADMIN — PROCHLORPERAZINE EDISYLATE 10 MG: 5 INJECTION INTRAMUSCULAR; INTRAVENOUS at 01:55

## 2019-08-19 RX ADMIN — KETOROLAC TROMETHAMINE 30 MG: 30 INJECTION, SOLUTION INTRAMUSCULAR at 01:55

## 2019-08-19 RX ADMIN — DIPHENHYDRAMINE HYDROCHLORIDE 25 MG: 50 INJECTION, SOLUTION INTRAMUSCULAR; INTRAVENOUS at 01:55

## 2019-08-19 NOTE — ED TRIAGE NOTES
Pt states she has headaches off and on x months states couple of months ago had an MRI done and everything was fine states on Friday when to have an adjustment of her neck and started to have headache and now the headache is worse and has been feeling some dizziness off and on today

## 2019-08-19 NOTE — ED NOTES
I have reviewed discharge instructions with the patient. The patient verbalized understanding. Patient left ED via Discharge Method: ambulatory to Home with     Opportunity for questions and clarification provided. Patient given 0 scripts. Pt in no acute distress at discharge      To continue your aftercare when you leave the hospital, you may receive an automated call from our care team to check in on how you are doing. This is a free service and part of our promise to provide the best care and service to meet your aftercare needs.  If you have questions, or wish to unsubscribe from this service please call [phone]. Thank you for Choosing our [facility] Emergency Department.

## 2019-08-19 NOTE — ED PROVIDER NOTES
29-year-old female presenting for headache. She has had ongoing issues with headaches on and off for some time. She even had an MRI back in April which was normal.  She normally has headaches that are left-sided and causing eye pain on the left side. This 1 is somewhat different and that it is more diffuse. Scribes as a throbbing headache running down the middle of her face. This been associated with some lightheadedness at work on Friday and since then just continued having the headache with some nausea. She denies any other symptoms such as vision, fever, neck stiffness. She took some Excedrin Migraine on Friday without much relief and has not really taken anything since then. Headache was continuing and kept her awake so that is what brought her in for further evaluation.            Past Medical History:   Diagnosis Date    Anemia affecting pregnancy in second trimester 2015    Iron deficiency anemia     Mild or unspecified pre-eclampsia, antepartum 2015    Ovarian cyst        Past Surgical History:   Procedure Laterality Date    HX  SECTION  2015    HX WISDOM TEETH EXTRACTION           Family History:   Problem Relation Age of Onset    Other Mother         anemia    No Known Problems Father     No Known Problems Sister     No Known Problems Brother     Breast Cancer Maternal Aunt     Diabetes Maternal Aunt     No Known Problems Maternal Uncle     No Known Problems Paternal Aunt     No Known Problems Paternal Uncle     No Known Problems Maternal Grandmother     No Known Problems Maternal Grandfather     No Known Problems Paternal Grandmother     No Known Problems Paternal Grandfather        Social History     Socioeconomic History    Marital status: SINGLE     Spouse name: Not on file    Number of children: Not on file    Years of education: Not on file    Highest education level: Not on file   Occupational History    Not on file   Social Needs    Financial resource strain: Not on file    Food insecurity:     Worry: Not on file     Inability: Not on file    Transportation needs:     Medical: Not on file     Non-medical: Not on file   Tobacco Use    Smoking status: Never Smoker    Smokeless tobacco: Never Used   Substance and Sexual Activity    Alcohol use: No     Comment: occ    Drug use: No    Sexual activity: Yes     Partners: Male     Birth control/protection: None   Lifestyle    Physical activity:     Days per week: Not on file     Minutes per session: Not on file    Stress: Not on file   Relationships    Social connections:     Talks on phone: Not on file     Gets together: Not on file     Attends Jew service: Not on file     Active member of club or organization: Not on file     Attends meetings of clubs or organizations: Not on file     Relationship status: Not on file    Intimate partner violence:     Fear of current or ex partner: Not on file     Emotionally abused: Not on file     Physically abused: Not on file     Forced sexual activity: Not on file   Other Topics Concern    Not on file   Social History Narrative    Not on file         ALLERGIES: Patient has no known allergies. Review of Systems   Neurological: Positive for headaches. All other systems reviewed and are negative. There were no vitals filed for this visit. Physical Exam   Constitutional: She is oriented to person, place, and time. She appears well-developed and well-nourished. HENT:   Head: Normocephalic and atraumatic. Eyes: Pupils are equal, round, and reactive to light. Conjunctivae are normal.   Neck: Neck supple. Cardiovascular: Normal rate and regular rhythm. Pulmonary/Chest: Effort normal and breath sounds normal.   Abdominal: Soft. Bowel sounds are normal.   Musculoskeletal: Normal range of motion. Neurological: She is alert and oriented to person, place, and time. Skin: Skin is warm and dry. Nursing note and vitals reviewed. MDM  Number of Diagnoses or Management Options  Nonintractable headache, unspecified chronicity pattern, unspecified headache type:   Diagnosis management comments: 26-year-old female presenting for headache. She has had ongoing issues with headaches but states this is somewhat different. Is been going on for 3 days and associated with some lightheadedness here and there. Amount and/or Complexity of Data Reviewed  Decide to obtain previous medical records or to obtain history from someone other than the patient: yes  Review and summarize past medical records: yes (Recent MRI that was negative)    Risk of Complications, Morbidity, and/or Mortality  Presenting problems: moderate  Diagnostic procedures: moderate  Management options: moderate  General comments: Patient's vital signs are normal, physical exam is normal, has recent imaging that was normal, and has had ongoing issues with headaches. Symptoms have completely resolved with a migraine cocktail. I feel that the patient can be discharged home safely to follow-up with her outpatient team    Patient Progress  Patient progress: resolved    ED Course as of Aug 19 0224   Mon Aug 19, 2019   0223 Since headache is resolved. Physical exam is reassuring. Vital signs are normal.  Discharging home.     [JS]      ED Course User Index  [JS] Nestor Jauregui MD       Procedures

## 2019-08-19 NOTE — DISCHARGE INSTRUCTIONS
Keep a bottle of Excedrin Migraine with you at all times. If you start to have a headache take the recommended dose as this can often prevent a headache from really getting started.   Continue working with your outpatient team for further issues

## 2020-08-13 PROBLEM — N93.8 DUB (DYSFUNCTIONAL UTERINE BLEEDING): Status: RESOLVED | Noted: 2018-11-26 | Resolved: 2020-08-13

## 2020-08-13 PROBLEM — O09.90 SUPERVISION OF HIGH RISK PREGNANCY, ANTEPARTUM: Status: ACTIVE | Noted: 2020-08-13

## 2020-08-13 PROBLEM — N93.9 EXCESSIVE VAGINAL BLEEDING: Status: RESOLVED | Noted: 2018-08-23 | Resolved: 2020-08-13

## 2020-08-13 PROBLEM — Z30.09 FAMILY PLANNING EDUCATION, GUIDANCE, AND COUNSELING: Status: RESOLVED | Noted: 2018-11-16 | Resolved: 2020-08-13

## 2020-08-13 PROBLEM — Z98.891 HISTORY OF CESAREAN DELIVERY: Status: ACTIVE | Noted: 2020-08-13

## 2020-08-13 LAB
HBSAG, EXTERNAL: NEGATIVE
HEPATITIS C AB,   EXT: NEGATIVE
HIV, EXTERNAL: NORMAL
RPR, EXTERNAL: NORMAL
RUBELLA, EXTERNAL: NORMAL

## 2020-11-13 PROBLEM — O99.019 ANTEPARTUM ANEMIA: Status: ACTIVE | Noted: 2020-11-13

## 2021-01-13 PROBLEM — O99.810 ABNORMAL MATERNAL GLUCOSE TOLERANCE, ANTEPARTUM: Status: ACTIVE | Noted: 2021-01-13

## 2021-01-25 ENCOUNTER — HOSPITAL ENCOUNTER (EMERGENCY)
Age: 26
Discharge: HOME | End: 2021-01-25
Attending: OBSTETRICS & GYNECOLOGY | Admitting: OBSTETRICS & GYNECOLOGY
Payer: COMMERCIAL

## 2021-01-25 VITALS
BODY MASS INDEX: 32.94 KG/M2 | WEIGHT: 179 LBS | HEART RATE: 80 BPM | RESPIRATION RATE: 16 BRPM | HEIGHT: 62 IN | DIASTOLIC BLOOD PRESSURE: 76 MMHG | SYSTOLIC BLOOD PRESSURE: 128 MMHG | TEMPERATURE: 99 F

## 2021-01-25 LAB — FIBRONECTIN FETAL VAG QL: NEGATIVE

## 2021-01-25 PROCEDURE — 59025 FETAL NON-STRESS TEST: CPT

## 2021-01-25 PROCEDURE — 82731 ASSAY OF FETAL FIBRONECTIN: CPT

## 2021-01-25 PROCEDURE — 99284 EMERGENCY DEPT VISIT MOD MDM: CPT

## 2021-01-25 NOTE — PROGRESS NOTES
Pt arrives to [facility] ED with complaints of lower back pain and lower stomach pain. Does not think she is ruptured.

## 2021-01-26 NOTE — H&P
Chief Complaint   Patient presents with    Abdominal Pain     lower back pain       31 y.o. female  at 31w0d  weeks gestation who requests evaluation for lower abdominal cramping and low back pain for one week, worse today. She has been having more trouble with constipation, last bm 3 days ago. No vb or lof. No recent pelvic exam or intercourse.   Her pregnancy issues include: hx of prior cs, hx of tcp    Fetal movement has beennormal .    HISTORY:  OB History    Para Term  AB Living   2 1 1     1   SAB TAB Ectopic Molar Multiple Live Births           0 1      # Outcome Date GA Lbr Jermain/2nd Weight Sex Delivery Anes PTL Lv   2 Current            1 Term 08/05/15 37w4d  3.04 kg M  LO GENERAL AN N TAD      Complications: Failure to Progress in First Stage, Other (comment)       Past Surgical History:   Procedure Laterality Date    HX  SECTION  2015    HX WISDOM TEETH EXTRACTION         Past Medical History:   Diagnosis Date    Anemia affecting pregnancy in second trimester 2015    Anxiety     Hypertension     Iron deficiency anemia     Migraines     Ovarian cyst     Thrombocytopenia (HCC)        No Known Allergies    Family History   Problem Relation Age of Onset    Other Mother         anemia    Diabetes Father     No Known Problems Sister     No Known Problems Brother     Breast Cancer Maternal Aunt     Diabetes Maternal Aunt     No Known Problems Maternal Uncle     No Known Problems Paternal Aunt     No Known Problems Paternal Uncle     No Known Problems Maternal Grandmother     No Known Problems Maternal Grandfather     Hypertension Paternal Grandmother     Diabetes Paternal Grandmother     No Known Problems Paternal Grandfather        Social History     Socioeconomic History    Marital status: SINGLE     Spouse name: Not on file    Number of children: Not on file    Years of education: Not on file    Highest education level: Not on file Occupational History    Not on file   Social Needs    Financial resource strain: Not on file    Food insecurity     Worry: Not on file     Inability: Not on file    Transportation needs     Medical: Not on file     Non-medical: Not on file   Tobacco Use    Smoking status: Never Smoker    Smokeless tobacco: Never Used   Substance and Sexual Activity    Alcohol use: Not Currently     Comment: occ    Drug use: Never    Sexual activity: Yes     Partners: Male     Birth control/protection: None   Lifestyle    Physical activity     Days per week: Not on file     Minutes per session: Not on file    Stress: Not on file   Relationships    Social connections     Talks on phone: Not on file     Gets together: Not on file     Attends Adventism service: Not on file     Active member of club or organization: Not on file     Attends meetings of clubs or organizations: Not on file     Relationship status: Not on file    Intimate partner violence     Fear of current or ex partner: Not on file     Emotionally abused: Not on file     Physically abused: Not on file     Forced sexual activity: Not on file   Other Topics Concern    Not on file   Social History Narrative    Not on file       ROS:  Negative:   negative 10 point ROS except as noted in HPI    Positive:   per hpi    PHYSICAL EXAM:  Blood pressure 128/76, pulse 80, temperature 99 °F (37.2 °C), resp. rate 16, height 5' 2\" (1.575 m), weight 81.2 kg (179 lb), last menstrual period 06/22/2020. The patient appears well, alert, oriented x 3. Appropriate affect. Lungs are clear. Heart RRR, no murmurs. Abdomen soft, non-tender, no rebound/guarding, normoactive bs. Fundus soft and non tender  Skin warm, dry, no rashes  Ext no edema, DTR's normal  ffn obtained.   Cervix: Closed/Thick/High    Fetal Heart Rate: Reactive  Baseline: 130 per minute  Variability: moderate  Accelerations: yes  Decelerations: none  Uterine contractions: one seen   I personally reviewed and interpreted the FHR tracing    Tests performed and reviwed:   UA: normal    FFN:   negative      I have personally reviewed the patient's history, prenatal record, and pertinent test results. vital sign trends, laboratory results, previous provider notes support my clinical impression. Assessment:  31 y.o. female at 31w0d  lower abdominal discomfort. probably constipation related. Plan:  Findings and test results were discussed. D/c home with ptl precautions, constipation mgmt recommendations.     Signed By:  Matt Watts MD     January 25, 2021

## 2021-01-26 NOTE — DISCHARGE INSTRUCTIONS
Keep appointment as scheduled. Call your [facility] physician with any questions or concerns.

## 2021-03-03 LAB — GRBS, EXTERNAL: POSITIVE

## 2021-03-21 ENCOUNTER — HOSPITAL ENCOUNTER (OUTPATIENT)
Age: 26
Setting detail: OBSERVATION
Discharge: HOME | End: 2021-03-21
Attending: OBSTETRICS & GYNECOLOGY | Admitting: OBSTETRICS & GYNECOLOGY
Payer: COMMERCIAL

## 2021-03-21 VITALS
DIASTOLIC BLOOD PRESSURE: 75 MMHG | SYSTOLIC BLOOD PRESSURE: 125 MMHG | TEMPERATURE: 98.5 F | RESPIRATION RATE: 18 BRPM | WEIGHT: 187 LBS | HEIGHT: 62 IN | BODY MASS INDEX: 34.41 KG/M2 | HEART RATE: 80 BPM

## 2021-03-21 PROBLEM — O36.8130 DECREASED FETAL MOVEMENT AFFECTING MANAGEMENT OF PREGNANCY IN THIRD TRIMESTER: Status: ACTIVE | Noted: 2021-03-21

## 2021-03-21 PROCEDURE — 99283 EMERGENCY DEPT VISIT LOW MDM: CPT | Performed by: OBSTETRICS & GYNECOLOGY

## 2021-03-21 PROCEDURE — 76815 OB US LIMITED FETUS(S): CPT

## 2021-03-21 PROCEDURE — 99218 HC RM OBSERVATION: CPT

## 2021-03-21 NOTE — H&P
Management d/w Dr. Mick Crawley. Will monitor pt over the next 4 hours. As long as the FHR tracing is reassuring and reactive over that time period, will discharge pt to home with labor and KK precautions and have her f/u in the office tomorrow morning.

## 2021-03-21 NOTE — PROGRESS NOTES
1125 pt presents with complaints of decreased fetal movement  1215 Report to Dr Eleazar España about decal x1 down to 54 x 30 seconds. Dr Tenorio strip. Scanned pt for GLENDY and sve closed thick and high. 46 Dr Eleazar España on phone, has discussed POC with Dr Naheed Nichols Will keep             pt NPO and watch for 4 hours. May Discharge then.     1341 Report to Keith Jones RN

## 2021-03-21 NOTE — H&P
Chief Complaint: decreased FM      31 y.o. female  at 38w6d  weeks gestation who is seen for 24 hours of decreased FM, although, fetal movement has been normal over the last 2 hours. Pt denies VB, LOF, UTI, PEC, or C-19 symptoms. Pt is s/p C section and is hoping for a CARYL. HISTORY:    Social History     Substance and Sexual Activity   Sexual Activity Yes    Partners: Male    Birth control/protection: None     Patient's last menstrual period was 2020.     Social History     Socioeconomic History    Marital status: SINGLE     Spouse name: Not on file    Number of children: Not on file    Years of education: Not on file    Highest education level: Not on file   Occupational History    Not on file   Social Needs    Financial resource strain: Not on file    Food insecurity     Worry: Not on file     Inability: Not on file    Transportation needs     Medical: Not on file     Non-medical: Not on file   Tobacco Use    Smoking status: Never Smoker    Smokeless tobacco: Never Used   Substance and Sexual Activity    Alcohol use: Not Currently     Comment: occ    Drug use: Never    Sexual activity: Yes     Partners: Male     Birth control/protection: None   Lifestyle    Physical activity     Days per week: Not on file     Minutes per session: Not on file    Stress: Not on file   Relationships    Social connections     Talks on phone: Not on file     Gets together: Not on file     Attends Yazdanism service: Not on file     Active member of club or organization: Not on file     Attends meetings of clubs or organizations: Not on file     Relationship status: Not on file    Intimate partner violence     Fear of current or ex partner: Not on file     Emotionally abused: Not on file     Physically abused: Not on file     Forced sexual activity: Not on file   Other Topics Concern    Not on file   Social History Narrative    Not on file       Past Surgical History:   Procedure Laterality Date    HX  SECTION  2015    HX WISDOM TEETH EXTRACTION         Past Medical History:   Diagnosis Date    Anemia affecting pregnancy in second trimester 2015    Anxiety     Hypertension     Iron deficiency anemia     Migraines     Ovarian cyst     Thrombocytopenia (HCC)          ROS:  An 8 point review of symptoms negative except for chief complaint as described above. PHYSICAL EXAM:  Blood pressure 125/75, pulse 80, temperature 98.5 °F (36.9 °C), resp. rate 18, height 5' 2\" (1.575 m), weight 84.8 kg (187 lb), last menstrual period 2020. Constitutional: The patient appears well, alert, oriented x 3. Cardiovascular: Heart RRR, no murmurs. Respiratory: Lungs clear, no respiratory distress  GI: Abdomen soft, nontender, no guarding  No fundal tenderness  Musculoskeletal: no cva tenderness  Lower ext: no edema, neg shailesh's, reflexes +2  Skin: no rashes or lesions  Psychiatric:Mood/ Affect: appropriate  Genitourinary: SVE: cl/th/high  FHT: Category 1 with mod variability and + accels; reactive. There was a 30 second period of the FHR in the 60s. The FHR has recovered spontaneously  TOCO: rare ctx  Abd ultrasound: vtx; active fetus; GLENDY - 16cm; placenta appears normal    I personally reviewed pt's medical record including relevant labs and ultrasounds  I reviewed the NST at today's encounter    Assessment/Plan:  Pt presents with 24 hours of decreased FM although, FM is normal over the last 1-2 hours. The NST is reactive other than a 30 second episode of FHR in the 60's. Will continue to monitor the FHR tracing at this time and discuss management with Dr. Thai Anaya, pt's PObP.

## 2021-03-21 NOTE — DISCHARGE INSTRUCTIONS
Patient Education   Patient Education        Pregnancy Precautions: Care Instructions  Your Care Instructions     There is no sure way to prevent labor before your due date ( labor) or to prevent most other pregnancy problems. But there are things you can do to increase your chances of a healthy pregnancy. Go to your appointments, follow your doctor's advice, and take good care of yourself. Eat well, and exercise (if your doctor agrees). And make sure to drink plenty of water. Follow-up care is a key part of your treatment and safety. Be sure to make and go to all appointments, and call your doctor if you are having problems. It's also a good idea to know your test results and keep a list of the medicines you take. How can you care for yourself at home? · Make sure you go to your prenatal appointments. At each visit, your doctor will check your blood pressure. Your doctor will also check to see if you have protein in your urine. High blood pressure and protein in urine are signs of preeclampsia. This condition can be dangerous for you and your baby. · Drink plenty of fluids, enough so that your urine is light yellow or clear like water. Dehydration can cause contractions. If you have kidney, heart, or liver disease and have to limit fluids, talk with your doctor before you increase the amount of fluids you drink. · Tell your doctor right away if you notice any symptoms of an infection, such as:  ? Burning when you urinate. ? A foul-smelling discharge from your vagina. ? Vaginal itching. ? Unexplained fever. ? Unusual pain or soreness in your uterus or lower belly. · Eat a balanced diet. Include plenty of foods that are high in calcium and iron. ? Foods high in calcium include milk, cheese, yogurt, almonds, and broccoli. ? Foods high in iron include red meat, shellfish, poultry, eggs, beans, raisins, whole-grain bread, and leafy green vegetables. · Do not smoke.  If you need help quitting, talk to your doctor about stop-smoking programs and medicines. These can increase your chances of quitting for good. · Do not drink alcohol or use illegal drugs. · Follow your doctor's directions about activity. Your doctor will let you know how much, if any, exercise you can do. · Ask your doctor if you can have sex. If you are at risk for early labor, your doctor may ask you to not have sex. · Take care to prevent falls. During pregnancy, your joints are loose, and your balance is off. Sports such as bicycling, skiing, or in-line skating can increase your risk of falling. And don't ride horses or motorcycles, dive, water ski, scuba dive, or parachute jump while you are pregnant. · Avoid getting very hot. Do not use saunas or hot tubs. Avoid staying out in the sun in hot weather for long periods. Take acetaminophen (Tylenol) to lower a high fever. · Do not take any over-the-counter or herbal medicines or supplements without talking to your doctor or pharmacist first.  When should you call for help? Call 911 anytime you think you may need emergency care. For example, call if:    · You passed out (lost consciousness).     · You have a seizure.     · You have severe vaginal bleeding.     · You have severe pain in your belly or pelvis.     · You have had fluid gushing or leaking from your vagina and you know or think the umbilical cord is bulging into your vagina. If this happens, immediately get down on your knees so your rear end (buttocks) is higher than your head. This will decrease the pressure on the cord until help arrives. Call your doctor now or seek immediate medical care if:    · You have signs of preeclampsia, such as:  ? Sudden swelling of your face, hands, or feet. ? New vision problems (such as dimness, blurring, or seeing spots).   ? A severe headache.     · You have any vaginal bleeding.     · You have belly pain or cramping.     · You have a fever.     · You have had regular contractions (with or without pain) for an hour. This means that you have 8 or more within 1 hour or 4 or more in 20 minutes after you change your position and drink fluids.     · You have a sudden release of fluid from your vagina.     · You have low back pain or pelvic pressure that does not go away.     · You notice that your baby has stopped moving or is moving much less than normal.   Watch closely for changes in your health, and be sure to contact your doctor if you have any problems. Where can you learn more? Go to http://www.ramirez.com/  Enter Y951 in the search box to learn more about \"Pregnancy Precautions: Care Instructions. \"  Current as of: 2020               Content Version: 12.6   [employer]. Care instructions adapted under license by [employer] (which disclaims liability or warranty for this information). If you have questions about a medical condition or this instruction, always ask your healthcare professional. [place] [address] any warranty or liability for your use of this information. Week 39 of Your Pregnancy: Care Instructions  Your Care Instructions     During these final weeks, you may feel anxious to see your new baby. [place] babies often look different from what you see in pictures or movies. Right after birth, their heads may have a strange shape. Their eyes may be puffy. And their genitals may be swollen. They may also have very dry skin, or red marks on the eyelids, nose, or neck. Still, most parents think their babies are beautiful. Follow-up care is a key part of your treatment and safety. Be sure to make and go to all appointments, and call your doctor if you are having problems. It's also a good idea to know your test results and keep a list of the medicines you take. How can you care for yourself at home? Prepare to breastfeed  · If you are breastfeeding, continue to eat healthy foods.   · If your health care provider recommends it, keep taking your prenatal vitamins. · Talk to your doctor before you take any medicine or supplement. That's because some medicines can affect your breast milk. · You can help prevent sore nipples if you feed your baby in the correct position. Nurses will help you learn to do this. Choose the right birth control after your baby is born  · Women who are breastfeeding can still get pregnant. Use birth control if you don't want to get pregnant. · Intrauterine devices (IUDs) are very effective at preventing pregnancy and can provide birth control for 3 to 10 years, depending on the type. If you talk with your doctor before having your baby, the IUD can be placed right after you give birth. If you decide you want to get pregnant later, you can have it removed. They are safe to use while you are breastfeeding. · A hormonal implant is also very effective at preventing pregnancy. It is placed under the skin of the arm. This can be done right after you give birth. It releases the hormone progestin and prevents pregnancy for about 3 years. This can also be removed by a doctor at any time. It is safe to use while you are breastfeeding. · Depo-Provera can be used while you are breastfeeding. It is a shot you get every 3 months. · Birth control pills work well. But you need a different kind of pill for the first few weeks after giving birth. And when you start taking these pills, you need to make sure to use another type of birth control for 7 days after you start your pack. · Diaphragms, cervical caps, and condoms with spermicide work less well after birth. If you have a diaphragm or cervical cap, talk to your doctor to see if you need a different size. · Tubal ligation (tying your tubes) and vasectomy are both permanent. These are good options if you are sure you are done having children. Where can you learn more?   Go to http://www.gray.com/  Enter A811 in the search box to learn more about \"Week 39 of Your Pregnancy: Care Instructions. \"  Current as of: February 11, 2020               Content Version: 12.6  © 7577-8012 [employer]. Care instructions adapted under license by [employer] (which disclaims liability or warranty for this information). If you have questions about a medical condition or this instruction, always ask your healthcare professional. [place] [address] any warranty or liability for your use of this information.

## 2021-03-21 NOTE — PROGRESS NOTES
Discharge instructions given to pt; pt verbalized understand; pt to ambulate out without assistance.

## 2021-03-21 NOTE — PROGRESS NOTES
Spoke with Dr. Irving Palumbo; gave update on strip; orders received that pt may be discharged home.

## 2021-03-23 ENCOUNTER — ANESTHESIA EVENT (OUTPATIENT)
Dept: LABOR AND DELIVERY | Age: 26
End: 2021-03-23
Payer: COMMERCIAL

## 2021-03-23 ENCOUNTER — HOSPITAL ENCOUNTER (INPATIENT)
Age: 26
LOS: 3 days | Discharge: HOME | End: 2021-03-26
Attending: OBSTETRICS & GYNECOLOGY | Admitting: OBSTETRICS & GYNECOLOGY
Payer: COMMERCIAL

## 2021-03-23 ENCOUNTER — ANESTHESIA (OUTPATIENT)
Dept: LABOR AND DELIVERY | Age: 26
End: 2021-03-23
Payer: COMMERCIAL

## 2021-03-23 DIAGNOSIS — O34.219 VBAC, DELIVERED: ICD-10-CM

## 2021-03-23 PROBLEM — R10.9 ABDOMINAL PAIN DURING PREGNANCY IN THIRD TRIMESTER: Status: ACTIVE | Noted: 2021-03-23

## 2021-03-23 PROBLEM — O99.119 THROMBOCYTOPENIA AFFECTING PREGNANCY (HCC): Status: ACTIVE | Noted: 2021-03-23

## 2021-03-23 PROBLEM — O26.893 ABDOMINAL PAIN DURING PREGNANCY IN THIRD TRIMESTER: Status: ACTIVE | Noted: 2021-03-23

## 2021-03-23 PROBLEM — Z37.9 NORMAL LABOR: Status: ACTIVE | Noted: 2021-03-23

## 2021-03-23 PROBLEM — D69.6 THROMBOCYTOPENIA AFFECTING PREGNANCY (HCC): Status: ACTIVE | Noted: 2021-03-23

## 2021-03-23 PROBLEM — Z98.891 PREVIOUS CESAREAN SECTION: Status: ACTIVE | Noted: 2021-03-23

## 2021-03-23 PROBLEM — R03.0 ELEVATED BP WITHOUT DIAGNOSIS OF HYPERTENSION: Status: ACTIVE | Noted: 2021-03-23

## 2021-03-23 LAB
ABO + RH BLD: NORMAL
ALBUMIN SERPL-MCNC: 3.1 G/DL (ref 3.5–5)
ALBUMIN/GLOB SERPL: 0.7 {RATIO} (ref 1.2–3.5)
ALP SERPL-CCNC: 176 U/L (ref 50–130)
ALT SERPL-CCNC: 10 U/L (ref 12–65)
ANION GAP SERPL CALC-SCNC: 7 MMOL/L (ref 7–16)
AST SERPL-CCNC: 14 U/L (ref 15–37)
BASOPHILS # BLD: 0 K/UL (ref 0–0.2)
BASOPHILS NFR BLD: 0 % (ref 0–2)
BILIRUB SERPL-MCNC: 0.1 MG/DL (ref 0.2–1.1)
BLOOD GROUP ANTIBODIES SERPL: NORMAL
BUN SERPL-MCNC: 7 MG/DL (ref 6–23)
CALCIUM SERPL-MCNC: 9.2 MG/DL (ref 8.3–10.4)
CHLORIDE SERPL-SCNC: 107 MMOL/L (ref 98–107)
CO2 SERPL-SCNC: 21 MMOL/L (ref 21–32)
CREAT SERPL-MCNC: 0.81 MG/DL (ref 0.6–1)
CREAT UR-MCNC: <13 MG/DL
DIFFERENTIAL METHOD BLD: ABNORMAL
EOSINOPHIL # BLD: 0 K/UL (ref 0–0.8)
EOSINOPHIL NFR BLD: 0 % (ref 0.5–7.8)
ERYTHROCYTE [DISTWIDTH] IN BLOOD BY AUTOMATED COUNT: 14 % (ref 11.9–14.6)
GLOBULIN SER CALC-MCNC: 4.4 G/DL (ref 2.3–3.5)
GLUCOSE SERPL-MCNC: 110 MG/DL (ref 65–100)
GLUCOSE, GLUUPC: NORMAL
HCT VFR BLD AUTO: 40.2 % (ref 35.8–46.3)
HGB BLD-MCNC: 12.9 G/DL (ref 11.7–15.4)
IMM GRANULOCYTES # BLD AUTO: 0.1 K/UL (ref 0–0.5)
IMM GRANULOCYTES NFR BLD AUTO: 1 % (ref 0–5)
KETONES UR-MCNC: NORMAL MG/DL
LDH SERPL L TO P-CCNC: 181 U/L (ref 100–190)
LYMPHOCYTES # BLD: 1.7 K/UL (ref 0.5–4.6)
LYMPHOCYTES NFR BLD: 22 % (ref 13–44)
MCH RBC QN AUTO: 24.1 PG (ref 26.1–32.9)
MCHC RBC AUTO-ENTMCNC: 32.1 G/DL (ref 31.4–35)
MCV RBC AUTO: 75.1 FL (ref 79.6–97.8)
MONOCYTES # BLD: 0.7 K/UL (ref 0.1–1.3)
MONOCYTES NFR BLD: 9 % (ref 4–12)
NEUTS SEG # BLD: 5.1 K/UL (ref 1.7–8.2)
NEUTS SEG NFR BLD: 68 % (ref 43–78)
NRBC # BLD: 0 K/UL (ref 0–0.2)
PLATELET # BLD AUTO: 112 K/UL (ref 150–450)
PLATELET COMMENTS,PCOM: ABNORMAL
PMV BLD AUTO: ABNORMAL FL (ref 9.4–12.3)
POTASSIUM SERPL-SCNC: 4 MMOL/L (ref 3.5–5.1)
PROT SERPL-MCNC: 7.5 G/DL (ref 6.3–8.2)
PROT UR QL: NEGATIVE
PROT UR-MCNC: <5 MG/DL
PROT/CREAT UR-RTO: NORMAL
RBC # BLD AUTO: 5.35 M/UL (ref 4.05–5.2)
RBC MORPH BLD: ABNORMAL
SODIUM SERPL-SCNC: 135 MMOL/L (ref 136–145)
SPECIMEN EXP DATE BLD: NORMAL
URATE SERPL-MCNC: 3.9 MG/DL (ref 2.6–6)
WBC # BLD AUTO: 7.6 K/UL (ref 4.3–11.1)
WBC MORPH BLD: ABNORMAL

## 2021-03-23 PROCEDURE — 80053 COMPREHEN METABOLIC PANEL: CPT

## 2021-03-23 PROCEDURE — 74011000250 HC RX REV CODE- 250: Performed by: ANESTHESIOLOGY

## 2021-03-23 PROCEDURE — 84550 ASSAY OF BLOOD/URIC ACID: CPT

## 2021-03-23 PROCEDURE — 74011000258 HC RX REV CODE- 258: Performed by: OBSTETRICS & GYNECOLOGY

## 2021-03-23 PROCEDURE — 74011250636 HC RX REV CODE- 250/636: Performed by: OBSTETRICS & GYNECOLOGY

## 2021-03-23 PROCEDURE — 81002 URINALYSIS NONAUTO W/O SCOPE: CPT | Performed by: OBSTETRICS & GYNECOLOGY

## 2021-03-23 PROCEDURE — 86901 BLOOD TYPING SEROLOGIC RH(D): CPT

## 2021-03-23 PROCEDURE — 99285 EMERGENCY DEPT VISIT HI MDM: CPT

## 2021-03-23 PROCEDURE — 74011250636 HC RX REV CODE- 250/636: Performed by: ANESTHESIOLOGY

## 2021-03-23 PROCEDURE — 65270000029 HC RM PRIVATE

## 2021-03-23 PROCEDURE — 59025 FETAL NON-STRESS TEST: CPT

## 2021-03-23 PROCEDURE — 84156 ASSAY OF PROTEIN URINE: CPT

## 2021-03-23 PROCEDURE — 85025 COMPLETE CBC W/AUTO DIFF WBC: CPT

## 2021-03-23 PROCEDURE — 83615 LACTATE (LD) (LDH) ENZYME: CPT

## 2021-03-23 RX ORDER — SODIUM CHLORIDE 0.9 % (FLUSH) 0.9 %
5-40 SYRINGE (ML) INJECTION AS NEEDED
Status: DISCONTINUED | OUTPATIENT
Start: 2021-03-23 | End: 2021-03-24 | Stop reason: HOSPADM

## 2021-03-23 RX ORDER — LIDOCAINE HYDROCHLORIDE 10 MG/ML
1 INJECTION INFILTRATION; PERINEURAL
Status: DISCONTINUED | OUTPATIENT
Start: 2021-03-23 | End: 2021-03-24 | Stop reason: HOSPADM

## 2021-03-23 RX ORDER — LIDOCAINE HYDROCHLORIDE 20 MG/ML
JELLY TOPICAL
Status: DISCONTINUED | OUTPATIENT
Start: 2021-03-23 | End: 2021-03-24 | Stop reason: HOSPADM

## 2021-03-23 RX ORDER — OXYTOCIN/RINGER'S LACTATE 30/500 ML
10 PLASTIC BAG, INJECTION (ML) INTRAVENOUS AS NEEDED
Status: COMPLETED | OUTPATIENT
Start: 2021-03-23 | End: 2021-03-24

## 2021-03-23 RX ORDER — DEXTROSE, SODIUM CHLORIDE, SODIUM LACTATE, POTASSIUM CHLORIDE, AND CALCIUM CHLORIDE 5; .6; .31; .03; .02 G/100ML; G/100ML; G/100ML; G/100ML; G/100ML
125 INJECTION, SOLUTION INTRAVENOUS CONTINUOUS
Status: DISCONTINUED | OUTPATIENT
Start: 2021-03-23 | End: 2021-03-24 | Stop reason: HOSPADM

## 2021-03-23 RX ORDER — MINERAL OIL
120 OIL (ML) ORAL
Status: COMPLETED | OUTPATIENT
Start: 2021-03-23 | End: 2021-03-24

## 2021-03-23 RX ORDER — ONDANSETRON 2 MG/ML
4 INJECTION INTRAMUSCULAR; INTRAVENOUS
Status: DISCONTINUED | OUTPATIENT
Start: 2021-03-23 | End: 2021-03-24 | Stop reason: HOSPADM

## 2021-03-23 RX ORDER — EPHEDRINE SULFATE/0.9% NACL/PF 50 MG/5 ML
SYRINGE (ML) INTRAVENOUS AS NEEDED
Status: DISCONTINUED | OUTPATIENT
Start: 2021-03-23 | End: 2021-03-24 | Stop reason: HOSPADM

## 2021-03-23 RX ORDER — SODIUM CHLORIDE 0.9 % (FLUSH) 0.9 %
5-40 SYRINGE (ML) INJECTION EVERY 8 HOURS
Status: DISCONTINUED | OUTPATIENT
Start: 2021-03-23 | End: 2021-03-24 | Stop reason: HOSPADM

## 2021-03-23 RX ORDER — BUTORPHANOL TARTRATE 2 MG/ML
1 INJECTION INTRAMUSCULAR; INTRAVENOUS
Status: DISCONTINUED | OUTPATIENT
Start: 2021-03-23 | End: 2021-03-24 | Stop reason: HOSPADM

## 2021-03-23 RX ORDER — OXYTOCIN/RINGER'S LACTATE 30/500 ML
87.3 PLASTIC BAG, INJECTION (ML) INTRAVENOUS AS NEEDED
Status: DISCONTINUED | OUTPATIENT
Start: 2021-03-23 | End: 2021-03-24 | Stop reason: HOSPADM

## 2021-03-23 RX ADMIN — ROPIVACAINE HYDROCHLORIDE 8 ML/HR: 2 INJECTION, SOLUTION EPIDURAL; INFILTRATION at 23:50

## 2021-03-23 RX ADMIN — LIDOCAINE HYDROCHLORIDE,EPINEPHRINE BITARTRATE 3 ML: 15; .005 INJECTION, SOLUTION EPIDURAL; INFILTRATION; INTRACAUDAL; PERINEURAL at 23:38

## 2021-03-23 RX ADMIN — Medication 10 MG: at 23:41

## 2021-03-23 RX ADMIN — SODIUM CHLORIDE 5 MILLION UNITS: 900 INJECTION INTRAVENOUS at 20:14

## 2021-03-23 RX ADMIN — SODIUM CHLORIDE, SODIUM LACTATE, POTASSIUM CHLORIDE, CALCIUM CHLORIDE, AND DEXTROSE MONOHYDRATE 125 ML/HR: 600; 310; 30; 20; 5 INJECTION, SOLUTION INTRAVENOUS at 20:00

## 2021-03-23 RX ADMIN — Medication 8 ML: at 23:48

## 2021-03-23 NOTE — H&P
History & Physical    Name: Jovanni Villanueva MRN: [id number]  SSN: [id number]    YOB: 1995  Age: 31 y.o. Sex: female      Chief c/o: painful contractions  Subjective:     Estimated Date of Delivery: 3/29/21  OB History    Para Term  AB Living   2 1 1     1   SAB TAB Ectopic Molar Multiple Live Births           0 1      # Outcome Date GA Lbr Jermain/2nd Weight Sex Delivery Anes PTL Lv   2 Current            1 Term 08/05/15 37w4d  3.04 kg M  LO GENERAL AN N TAD      Complications: Failure to Progress in First Stage, Other (comment)       Ms. Vero Gonzáles is admitted with pregnancy at 36w3d for active labor. Prenatal course was complicated by previous csection and history of low platelets. Please see prenatal records for details.     Past Medical History:   Diagnosis Date    Anemia affecting pregnancy in second trimester 2015    Anxiety     Hypertension     Iron deficiency anemia     Migraines     Ovarian cyst     Thrombocytopenia (HCC)      Past Surgical History:   Procedure Laterality Date    HX  SECTION  2015    HX WISDOM TEETH EXTRACTION       Social History     Occupational History    Not on file   Tobacco Use    Smoking status: Never Smoker    Smokeless tobacco: Never Used   Substance and Sexual Activity    Alcohol use: Not Currently     Comment: occ    Drug use: Never    Sexual activity: Yes     Partners: Male     Birth control/protection: None     Family History   Problem Relation Age of Onset    Other Mother         anemia    Diabetes Father     No Known Problems Sister     No Known Problems Brother     Breast Cancer Maternal Aunt     Diabetes Maternal Aunt     No Known Problems Maternal Uncle     No Known Problems Paternal Aunt     No Known Problems Paternal Uncle     No Known Problems Maternal Grandmother     No Known Problems Maternal Grandfather     Hypertension Paternal Grandmother     Diabetes Paternal Grandmother     No Known Problems Paternal Grandfather        No Known Allergies  Prior to Admission medications    Medication Sig Start Date End Date Taking? Authorizing Provider   prenatal vit-iron fumarate-fa 27 mg iron- 0.8 mg tab tablet Take 1 Tab by mouth daily. Yes Provider, Historical   Cetirizine (ZyrTEC) 10 mg cap Take  by mouth. Yes Provider, Historical   ascorbic acid, vitamin C, (Vitamin C) 250 mg tablet Take 500 mg by mouth. Yes Provider, Historical        Review of Systems: A comprehensive review of systems was negative except for that written in the HPI. Objective:     Vitals:  Vitals:    03/23/21 1739 03/23/21 1741   BP:  139/85   Pulse:  (!) 111   Resp:  20   Temp:  98.8 °F (37.1 °C)   SpO2:  98%   Weight: 84.8 kg (187 lb)    Height: 5' 2\" (1.575 m)         Physical Exam:  Patient without distress.   Heart: Regular rate and rhythm  Lung: clear to auscultation throughout lung fields, no wheezes, no rales, no rhonchi and normal respiratory effort  Back: costovertebral angle tenderness absent  Abdomen: soft, nontender  Fundus: soft and non tender  Perineum: blood absent, amniotic fluid absent  Cervical Exam: 3 cm dilated    80% effaced    -2 station    Presenting Part: cephalic  Lower Extremities:  - Edema trace   - Patellar Reflexes: 2+ bilaterally  Skin - no rashes or lesions  Membranes:  Intact  Fetal Heart Rate: Baseline: 120 per minute  Variability: moderate  Accelerations: yes  Decelerations: none  Uterine contractions: regular, every 3 minutes  Recent Results (from the past 12 hour(s))   POC URINE DIPSTICK MANUAL    Collection Time: 03/23/21  6:10 PM   Result Value Ref Range    Protein (POC) Negative Negative    Glucose, urine (POC) 100 mg/dL Negative    Ketones (POC) 40 mg/dL Negative   TYPE & SCREEN    Collection Time: 03/23/21  7:43 PM   Result Value Ref Range    Crossmatch Expiration 03/26/2021,2359     ABO/Rh(D) A POSITIVE     Antibody screen NEG    METABOLIC PANEL, COMPREHENSIVE    Collection Time: 21  7:43 PM   Result Value Ref Range    Sodium 135 (L) 136 - 145 mmol/L    Potassium 4.0 3.5 - 5.1 mmol/L    Chloride 107 98 - 107 mmol/L    CO2 21 21 - 32 mmol/L    Anion gap 7 7 - 16 mmol/L    Glucose 110 (H) 65 - 100 mg/dL    BUN 7 6 - 23 MG/DL    Creatinine 0.81 0.6 - 1.0 MG/DL    GFR est AA >60 >60 ml/min/1.73m2    GFR est non-AA >60 >60 ml/min/1.73m2    Calcium 9.2 8.3 - 10.4 MG/DL    Bilirubin, total 0.1 (L) 0.2 - 1.1 MG/DL    ALT (SGPT) 10 (L) 12 - 65 U/L    AST (SGOT) 14 (L) 15 - 37 U/L    Alk. phosphatase 176 (H) 50 - 130 U/L    Protein, total 7.5 6.3 - 8.2 g/dL    Albumin 3.1 (L) 3.5 - 5.0 g/dL    Globulin 4.4 (H) 2.3 - 3.5 g/dL    A-G Ratio 0.7 (L) 1.2 - 3.5     CBC WITH AUTOMATED DIFF    Collection Time: 21  7:43 PM   Result Value Ref Range    WBC 7.6 4.3 - 11.1 K/uL    RBC 5.35 (H) 4.05 - 5.2 M/uL    HGB 12.9 11.7 - 15.4 g/dL    HCT 40.2 35.8 - 46.3 %    MCV 75.1 (L) 79.6 - 97.8 FL    MCH 24.1 (L) 26.1 - 32.9 PG    MCHC 32.1 31.4 - 35.0 g/dL    RDW 14.0 11.9 - 14.6 %    PLATELET 836 (L) 017 - 450 K/uL    MPV Unable to calculate. Recommend adding IPF.  9.4 - 12.3 FL    ABSOLUTE NRBC 0.00 0.0 - 0.2 K/uL    DF PENDING    URIC ACID    Collection Time: 21  7:43 PM   Result Value Ref Range    Uric acid 3.9 2.6 - 6.0 MG/DL   LD    Collection Time: 21  7:43 PM   Result Value Ref Range     100 - 190 U/L       Prenatal Labs:   Lab Results   Component Value Date/Time    ABO/Rh(D) A POSITIVE 2015 01:05 PM    Rubella, External Immune 2014    HBsAg, External Neg 2014    HIV, External Neg 2014    RPR, External Neg 2014    Gonorrhea, External Neg 2014    Chlamydia, External Neg 2014    ABO,Rh A Pos 2014         Assessment/Plan:     Active Problems:    Previous  section (3/23/2021)      Abdominal pain during pregnancy in third trimester (3/23/2021)      Elevated BP without diagnosis of hypertension (3/23/2021)      Thrombocytopenia affecting pregnancy ([facility] [address] (3/23/2021)      Normal labor (3/23/2021)         Plan:  30yo  at 39w1d in with active labor. Admit for Reassuring fetal status, Labor  Progressing normally. Group B Strep was positive, will treat prophylactically with penicillin. Pt has a history of mild thrombocytopenia. Plts now 112. Borderline bp. No headache, no edema, labs as above. Pt is a previous csection. Desires tolac. Has been counseled by her primary OB regarding risks/ benefits. Pt strongly desires TOLAC. Abdomen soft. No pain between contractions. No bleeding.  Reactive nst.

## 2021-03-24 PROCEDURE — 65270000029 HC RM PRIVATE

## 2021-03-24 PROCEDURE — 77030018846 HC SOL IRR STRL H20 ICUM -A

## 2021-03-24 PROCEDURE — 75410000002 HC LABOR FEE PER 1 HR

## 2021-03-24 PROCEDURE — 74011250636 HC RX REV CODE- 250/636: Performed by: OBSTETRICS & GYNECOLOGY

## 2021-03-24 PROCEDURE — 10907ZC DRAINAGE OF AMNIOTIC FLUID, THERAPEUTIC FROM PRODUCTS OF CONCEPTION, VIA NATURAL OR ARTIFICIAL OPENING: ICD-10-PCS | Performed by: OBSTETRICS & GYNECOLOGY

## 2021-03-24 PROCEDURE — 75410000000 HC DELIVERY VAGINAL/SINGLE

## 2021-03-24 PROCEDURE — 76060000078 HC EPIDURAL ANESTHESIA

## 2021-03-24 PROCEDURE — 77030019905 HC CATH URETH INTMIT MDII -A

## 2021-03-24 PROCEDURE — 74011250637 HC RX REV CODE- 250/637: Performed by: OBSTETRICS & GYNECOLOGY

## 2021-03-24 PROCEDURE — 75410000003 HC RECOV DEL/VAG/CSECN EA 0.5 HR

## 2021-03-24 PROCEDURE — 77030011943

## 2021-03-24 PROCEDURE — 74011000258 HC RX REV CODE- 258: Performed by: OBSTETRICS & GYNECOLOGY

## 2021-03-24 RX ORDER — ROPIVACAINE HYDROCHLORIDE 2 MG/ML
INJECTION, SOLUTION EPIDURAL; INFILTRATION; PERINEURAL
Status: DISCONTINUED | OUTPATIENT
Start: 2021-03-23 | End: 2021-03-24 | Stop reason: HOSPADM

## 2021-03-24 RX ORDER — SIMETHICONE 80 MG
80 TABLET,CHEWABLE ORAL
Status: DISCONTINUED | OUTPATIENT
Start: 2021-03-24 | End: 2021-03-26 | Stop reason: HOSPADM

## 2021-03-24 RX ORDER — DIPHENHYDRAMINE HCL 25 MG
25 CAPSULE ORAL
Status: DISCONTINUED | OUTPATIENT
Start: 2021-03-24 | End: 2021-03-26 | Stop reason: HOSPADM

## 2021-03-24 RX ORDER — IBUPROFEN 600 MG/1
600 TABLET ORAL
Status: DISCONTINUED | OUTPATIENT
Start: 2021-03-24 | End: 2021-03-26 | Stop reason: HOSPADM

## 2021-03-24 RX ORDER — PRENATAL VIT 96/IRON FUM/FOLIC 27MG-0.8MG
1 TABLET ORAL DAILY
Status: DISCONTINUED | OUTPATIENT
Start: 2021-03-25 | End: 2021-03-26 | Stop reason: HOSPADM

## 2021-03-24 RX ORDER — ZOLPIDEM TARTRATE 5 MG/1
5 TABLET ORAL
Status: DISCONTINUED | OUTPATIENT
Start: 2021-03-24 | End: 2021-03-26 | Stop reason: HOSPADM

## 2021-03-24 RX ORDER — HYDROCODONE BITARTRATE AND ACETAMINOPHEN 5; 325 MG/1; MG/1
1 TABLET ORAL
Status: DISCONTINUED | OUTPATIENT
Start: 2021-03-24 | End: 2021-03-26 | Stop reason: HOSPADM

## 2021-03-24 RX ORDER — LIDOCAINE HYDROCHLORIDE AND EPINEPHRINE 15; 5 MG/ML; UG/ML
INJECTION, SOLUTION EPIDURAL AS NEEDED
Status: DISCONTINUED | OUTPATIENT
Start: 2021-03-23 | End: 2021-03-24 | Stop reason: HOSPADM

## 2021-03-24 RX ORDER — PROMETHAZINE HYDROCHLORIDE 25 MG/1
25 TABLET ORAL
Status: DISCONTINUED | OUTPATIENT
Start: 2021-03-24 | End: 2021-03-26 | Stop reason: HOSPADM

## 2021-03-24 RX ORDER — DOCUSATE SODIUM 100 MG/1
100 CAPSULE, LIQUID FILLED ORAL
Status: DISCONTINUED | OUTPATIENT
Start: 2021-03-24 | End: 2021-03-26 | Stop reason: HOSPADM

## 2021-03-24 RX ORDER — OXYTOCIN/RINGER'S LACTATE 30/500 ML
1-25 PLASTIC BAG, INJECTION (ML) INTRAVENOUS
Status: DISCONTINUED | OUTPATIENT
Start: 2021-03-24 | End: 2021-03-26

## 2021-03-24 RX ORDER — OXYCODONE HYDROCHLORIDE 5 MG/1
5-10 TABLET ORAL
Status: DISCONTINUED | OUTPATIENT
Start: 2021-03-24 | End: 2021-03-26 | Stop reason: HOSPADM

## 2021-03-24 RX ORDER — ROPIVACAINE HYDROCHLORIDE 2 MG/ML
INJECTION, SOLUTION EPIDURAL; INFILTRATION; PERINEURAL AS NEEDED
Status: DISCONTINUED | OUTPATIENT
Start: 2021-03-23 | End: 2021-03-24 | Stop reason: HOSPADM

## 2021-03-24 RX ADMIN — SODIUM CHLORIDE, SODIUM LACTATE, POTASSIUM CHLORIDE, CALCIUM CHLORIDE, AND DEXTROSE MONOHYDRATE 125 ML/HR: 600; 310; 30; 20; 5 INJECTION, SOLUTION INTRAVENOUS at 06:57

## 2021-03-24 RX ADMIN — Medication 10000 MILLI-UNITS: at 09:46

## 2021-03-24 RX ADMIN — SODIUM CHLORIDE 2.5 MILLION UNITS: 9 INJECTION, SOLUTION INTRAVENOUS at 05:10

## 2021-03-24 RX ADMIN — IBUPROFEN 600 MG: 600 TABLET, FILM COATED ORAL at 22:28

## 2021-03-24 RX ADMIN — SODIUM CHLORIDE 2.5 MILLION UNITS: 9 INJECTION, SOLUTION INTRAVENOUS at 09:33

## 2021-03-24 RX ADMIN — SODIUM CHLORIDE 2.5 MILLION UNITS: 9 INJECTION, SOLUTION INTRAVENOUS at 01:02

## 2021-03-24 RX ADMIN — Medication 2 MILLI-UNITS/MIN: at 08:38

## 2021-03-24 RX ADMIN — IBUPROFEN 600 MG: 600 TABLET, FILM COATED ORAL at 11:19

## 2021-03-24 RX ADMIN — MINERAL OIL 120 ML: 1000 LIQUID ORAL at 09:51

## 2021-03-24 NOTE — LACTATION NOTE
In to see mom and infant for the first time. Mom stated that infant lactched and nursed well at first feeding and is still latching but having very short feeds and goes back to sleep. Informed mom that this is normal and reviewed the expectations of the first 24 hours. Lactation consultant will follow up tomorrow.

## 2021-03-24 NOTE — ADT AUTH CERT NOTES
Facility Name: [address] [place] [address]           
  
  
  
  
  
   
Patient Demographics Patient Name Carolina Alaniz Sex Female   
1995 Address  [facility]  
Patricia Espinoza Phone [phone] (Home) [phone] (Mobile) *Preferred* Hospital Account Name Acct ID Class Status Primary Coverage Carolina Alaniz [id number] INPATIENT Open BLUE [place]OSS - [place] BLUE CROSS [facility]  
  
   
Guarantor Account (for Hospital Account [de-identified]) Name Relation to Pt Service Area Active? Acct Type Chevis Ama E Self BONSC Yes Personal/Family Address Phone  [facility]  
[address] [phone](E)    
  
   
Coverage Information (for Hospital Account [de-identified]) F/O Payor/Plan Subscriber  Subscriber Sex Precert # BLUE CROSS/[place] BLUE CROSS [facility] 89 F Subscriber Subscriber # Carolina Alaniz [id number] Grp # Group Name [id number] NPIM Address Phone [address] Policy Number Status Effective Date Benefits Phone [id number] -  - Auth/Cert REF#  
  
   
Diagnosis Codes Comments Normal delivery at term  ICD-10-CM: O80  
ICD-9-CM: 650   
  
   
Admission Information Arrival Date/Time: 2021 1719 Admit Date/Time: 2021 1719 IP Adm. Date/Time: 2021 1859 Admission Type: Elective Point of Origin: Clinic Or Physician Office Admit Category:   
Means of Arrival:  Primary Service: Obstetrics Secondary Service: N/A Transfer Source:  Service Area: [facility] Unit: SFE 4 MOTHER INFANT Admit Provider: Julieta Gross MD Attending Provider: Von Davis MD Referring Provider:   
Admission Information Attending Provider Admission Dx Admitted on  
Von Davis MD Abdominal pain during pregnancy in third trimester, Previous  section, Elevated BP without diagnosis of hypertension, Thrombocytopenia affecting pregnancy [facility]), Normal labor 21 Service Isolation Code Status OBSTETRICS  Full Code Allergies Advance Care Planning No Known Allergies Jump to the Activity    
Admission Information Unit/Bed: E 4 MOTHER INFANT/ Service: OBSTETRICS Admitting provider: Mahi Meza MD Phone: [phone] Attending provider: Ainsley Olivares MD Phone: [phone] PCP: Amari Estrada MD Phone: [phone] Admission dx: Labor Patient class: I Admission type: EL    
 
 
MOM DELIVERY CHART-- 
 
Janel Martinez 
  
MRN: [id number] [employer] to [employer] Comment Last edited by  on  at [de-identified] name MRN Account  Age Sex Admission Type  
Rafa Zimmerman [id number] 3/24/21 0 days M Confirmed Admission - L&D  OB History Zenovia Herd 2 Para 2 Term 2   
   
 AB  
   
 Living  
2 SAB  
   
 TAB  
   
 Ectopic  
   
 Molar  
   
 Multiple  
0 Live Births 2  
  
  
# Outcome Date GA Labor/2nd Weight Sex Delivery Anes PTL Lv A1 A5  
1 Term 08/05/15 37w4d  3.04 kg M  LO General  N Living 8 9 Complications: Failure to Progress in First Stage, Other (comment) Location: Other Delivering Clinician: Adi Finn MD  
  
2 Term 21 39w2d 18h 28m / 0h 15m 3.215 kg M  Epidural N Living 9 9 Name: José Migueltyshawn Luis Location: Other Delivering Clinician: Pedro Martinez MD  
  
Prenatal History Most Recent Value Did You Receive Prenatal Care Yes Name Of [facility] Provider Herbert Stout Seen By MFM (Maternal Fetal Medicine)? No  
Fetal Ultrasound Abnormalities/Concerns? No  
Infant Feeding Breast Milk Circumcision Planned Yes Pediatrician After Birth/ Follow Up Baby Visits PS Dating Summary Working ANKUR: 21 set by Daniel Landry RN on 20 based on Last Menstrual Period on 20 Based On ANKUR GA Diff GA User Date Last Menstrual Period on 20 Working  Daniel Landry RN 20 Ultrasound on 20 -1d 7w2d Rizwan Rasmussen RN 20 Prenatal Results Prenatal Labs Test Value Date Time ABO/Rh * A Pos  14 Antibody Scrn Hgb Hct Platelets Rubella * Immune  20 RPR * Non-Reactive  20 T. Pallidum Antibody Urine Hep B Surf Ag * Negative  20 Hep C * Negative  20 HIV * Non-Reactive  20 Gonorrhea Chlamydia TSH     
GTT, 1 HR (Glucola) GTT, Fasting GTT, 1 HR     
GTT, 2 HR     
GTT, 3 HR     
3rd Trimester Test Value Date Time Hgb Hct Platelets Group B Strep * Positive  21 Antibody Scrn     
TSH     
T. Pallidum Antibody Hep B Surf Ag Gonorrhea Chlamydia Screening/Diagnostics Test Value Date Time  
AFP Only AFP Tetra Hgb Electrophoresis Amniocentesis Cystic Fibrosis Thalessemia Eric-Sachs Dilma Bonine PAP Smear Beta HCG     
NT     
NIPT     
[id number] Lab Test Value Date Time GTT, Fasting GTT, 1HR     
GTT, 2HR     
GTT, 3HR     
RPR * Non-Reactive  20 Beta HCG     
CMV Ab Toxoplasma Ab Varicella Zoster Ab Legend *: Historical 
View all results for this pregnancy Andrewamrit Colvin [[id number]] [facility] Delivery Birth date/time: 3/24/2021 09:43:00 Delivery type: , Spontaneous Labor Event Times Labor onset date/time: 3/23/2021 1500 Dilation complete date/time: 3/24/2021 2664 Start pushing date/time: 3/24/2021 0935 Labor Events  labor?: No 
 steroids?: None Cervical ripening type: None Antibiotics during labor?: Yes Rupture date/time: 3/24/2021 0820 Rupture type: AROM Fluid color: Clear Fluid odor: None Induction: None Augmentation: Oxytocin Augmentation indications: Ineffective Contraction Pattern Delivery Providers Delivering clinician: Amisha Arana MD 
Provider Role Milady Samuel MD Obstetrician Daryl Guo, RN Primary Nurse Myrna Knapp, RN Primary [place] Nurse Grisel Arvizu, RN Charge Nurse Anesthesia Method: Epidural 
Multiple Birth Onset Second Stage Second Stage Start Date: 3/24/21 Second Stage Start Time:  Operative Delivery Forceps attempted?: No 
Vacuum extractor attempted?: No 
Presentation Presentation: Vertex Position: Left Occiput Anterior Apgars Living status: Living Apgars:  1 min. :  5 min.:  10 min. :  15 min.:  20 min.:   
Skin color:  1  1 Heart rate:  2  2 Reflex irritability:  2  2 Muscle tone:  2  2 Respiratory effort:  2  2 Total:  9  9 Apgars assigned by: Adeola Mccormick Resuscitation Method: Suctioning-bulb, Tactile Stimulation Shoulder Dystocia Shoulder dystocia present?: No 
Measurements Weight: 3215 g Weight (lbs): 7 lb 1.4 oz Length: 50 cm Length (in): 19.69\" Head circumference: 33 cm Chest circumference: 32 cm Lacerations Episiotomy: None Lacerations: None Repair Needed: # of Repair Packets:  
Suture Type and Size:  
Suture Comment:  
Estimated Blood Loss (mL):    
Placenta Placenta Date/Time: 3/24/2021 8679 Removal: Spontaneous Appearance: Normal Placenta disposition: Discarded Vaginal Counts Initial count personnel: Emma Puente Final count personnel: SAME Accurate final count?: Yes Delivery Summary History Event User Date/Time Signed Milady Samuel MD 3/24/2021 09:55:56

## 2021-03-24 NOTE — LACTATION NOTE
This note was copied from a baby's chart. RN assists with infant latch. Infant latches without difficulty to right side in football hold.

## 2021-03-24 NOTE — ANESTHESIA PREPROCEDURE EVALUATION
Anesthetic History               Review of Systems / Medical History  Patient summary reviewed and pertinent labs reviewed    Pulmonary                   Neuro/Psych         Headaches     Cardiovascular    Hypertension (ghtn)              Exercise tolerance: >4 METS     GI/Hepatic/Renal                Endo/Other             Other Findings   Comments: H/o thrombocytopenia, currently >100       Anesthetic History               Review of Systems / Medical History  Patient summary reviewed and pertinent labs reviewed    Pulmonary                   Neuro/Psych              Cardiovascular                  Exercise tolerance: >4 METS     GI/Hepatic/Renal                Endo/Other             Other Findings   Comments: thrombocytopenia           Physical Exam    Airway  Mallampati: II  TM Distance: 4 - 6 cm  Neck ROM: normal range of motion   Mouth opening: Normal     Cardiovascular  Regular rate and rhythm,  S1 and S2 normal,  no murmur, click, rub, or gallop             Dental         Pulmonary  Breath sounds clear to auscultation               Abdominal         Other Findings            Anesthetic Plan    ASA: 3, emergent  Anesthesia type: general          Induction: Intravenous  Anesthetic plan and risks discussed with: Patient                Physical Exam    Airway  Mallampati: II  TM Distance: 4 - 6 cm  Neck ROM: normal range of motion   Mouth opening: Normal     Cardiovascular  Regular rate and rhythm,  S1 and S2 normal,  no murmur, click, rub, or gallop  Rhythm: regular  Rate: normal      Pertinent negatives: No murmur   Dental         Pulmonary  Breath sounds clear to auscultation               Abdominal         Other Findings            Anesthetic Plan    ASA: 3  Anesthesia type: epidural            Anesthetic plan and risks discussed with: Patient

## 2021-03-24 NOTE — L&D DELIVERY NOTE
Present for entire delivery. Details in delivery summary. /. Viable Male Infant, APGARS 9,9 .   Weight 7 lbs. , 1 oz. EBL:  200 mL  Pain mgmt:   epidural    Placenta spont, intact, 3VC. No lacerations    Pt and infant stable.         Angela Dasilva MD     9:55 AM    21

## 2021-03-24 NOTE — L&D DELIVERY NOTE
Delivery Summary    Patient: Glen Jordan MRN: [id number]  SSN: [id number]    YOB: 1995  Age: 31 y.o. Sex: female       Information for the patient's :  Jj Morton [[id number]]       Labor Events:    Labor: No    Steroids: None   Cervical Ripening Date/Time:       Cervical Ripening Type: None   Antibiotics During Labor: Yes   Rupture Identifier:      Rupture Date/Time: 3/24/2021 8:20 AM   Rupture Type: AROM   Amniotic Fluid Volume: Moderate    Amniotic Fluid Description: Clear    Amniotic Fluid Odor: None    Induction: None       Induction Date/Time:        Indications for Induction:      Augmentation: Oxytocin   Augmentation Date/Time:      Indications for Augmentation: Ineffective Contraction Pattern   Labor complications: Additional complications:        Delivery Events:  Indications For Episiotomy:     Episiotomy: None   Perineal Laceration(s): None   Repaired:     Periurethral Laceration Location:      Repaired:     Labial Laceration Location:     Repaired:     Sulcal Laceration Location:     Repaired:     Vaginal Laceration Location:     Repaired:     Cervical Laceration Location:     Repaired:     Repair Suture:     Number of Repair Packets:     Estimated Blood Loss (ml):  ml   Quantitative Blood Loss (ml)                Delivery Date: 3/24/2021    Delivery Time: 9:43 AM  Delivery Type: , Spontaneous  Sex:  Male    Gestational Age: 44w2d   Delivery Clinician:  Berenice Loredo  Living Status: Living   Delivery Location: L&D            APGARS  One minute Five minutes Ten minutes   Skin color: 1   1        Heart rate: 2   2        Grimace: 2   2        Muscle tone: 2   2        Breathin   2        Totals: 9   9            Presentation: Vertex    Position: Left Occiput Anterior  Resuscitation Method:  Suctioning-bulb; Tactile Stimulation     Meconium Stained: None      Cord Information: 3 Vessels  Complications: None  Cord around: Delayed cord clamping? Yes  Cord clamped date/time:3/24/2021  9:44 AM  Disposition of Cord Blood: Lab    Blood Gases Sent?: No    Placenta:  Date/Time: 3/24/2021  9:46 AM  Removal: Spontaneous      Appearance: Normal      Measurements:  Birth Weight: 7 lb 1.4 oz (3.215 kg)      Birth Length: 1' 7.69\" (0.5 m)      Head Circumference: 1' 0.99\" (0.33 m)      Chest Circumference: 1' 0.6\" (0.32 m)     Abdominal Girth: Other Providers:   Lara ADAMS;YOLANDE HENNING;ROSEMARY GARCIA;BALDOMERO HARRELL;BRITT BELL, Obstetrician;Primary Nurse;Primary  Nurse;Scrub Tech;Charge Nurse           Group B Strep:   Lab Results   Component Value Date/Time    GrBStrep, External Positive 2021     Information for the patient's :  Tyeivan Nolascobeata [[id number]]   No results found for: ABORH, PCTABR, PCTDIG, BILI, ABORHEXT, ABORH     No results for input(s): PCO2CB, PO2CB, HCO3I, SO2I, IBD, PTEMPI, SPECTI, PHICB, ISITE, IDEV, IALLEN in the last 72 hours.

## 2021-03-24 NOTE — ANESTHESIA POSTPROCEDURE EVALUATION
IRMA for labor and vaginal delivery. general    Anesthesia Post Evaluation      Multimodal analgesia: multimodal analgesia used between 6 hours prior to anesthesia start to PACU discharge  Patient location during evaluation: PACU  Patient participation: complete - patient participated  Level of consciousness: awake  Pain management: adequate  Airway patency: patent  Anesthetic complications: no  Cardiovascular status: acceptable and hemodynamically stable  Respiratory status: acceptable  Hydration status: acceptable  Comments: Acceptable for discharge from PACU. Post anesthesia nausea and vomiting:  none  Final Post Anesthesia Temperature Assessment:  Normothermia (36.0-37.5 degrees C)      INITIAL Post-op Vital signs: No vitals data found for the desired time range.

## 2021-03-24 NOTE — LACTATION NOTE

## 2021-03-24 NOTE — ROUTINE PROCESS
SBAR IN Report: Mother Verbal report received from Colleen Nair RN on this patient, who is now being transferred from L&D for routine progression of care. The patient is not wearing a green \"Anesthesia-Duramorph\" band. Report consisted of patient's Situation, Background, Assessment and Recommendations (SBAR). [place] ID bands were compared with the identification form, and verified with the patient and transferring nurse. Information from the SBAR and the [place] Report was reviewed with the transferring nurse; opportunity for questions and clarification provided.

## 2021-03-25 PROBLEM — O36.8130 DECREASED FETAL MOVEMENT AFFECTING MANAGEMENT OF PREGNANCY IN THIRD TRIMESTER: Status: RESOLVED | Noted: 2021-03-21 | Resolved: 2021-03-25

## 2021-03-25 PROBLEM — O99.810 ABNORMAL MATERNAL GLUCOSE TOLERANCE, ANTEPARTUM: Status: RESOLVED | Noted: 2021-01-13 | Resolved: 2021-03-25

## 2021-03-25 PROBLEM — R10.9 ABDOMINAL PAIN DURING PREGNANCY IN THIRD TRIMESTER: Status: RESOLVED | Noted: 2021-03-23 | Resolved: 2021-03-25

## 2021-03-25 PROBLEM — D69.6 THROMBOCYTOPENIA AFFECTING PREGNANCY (HCC): Status: RESOLVED | Noted: 2021-03-23 | Resolved: 2021-03-25

## 2021-03-25 PROBLEM — O26.893 ABDOMINAL PAIN DURING PREGNANCY IN THIRD TRIMESTER: Status: RESOLVED | Noted: 2021-03-23 | Resolved: 2021-03-25

## 2021-03-25 PROBLEM — Z98.891 PREVIOUS CESAREAN SECTION: Status: RESOLVED | Noted: 2021-03-23 | Resolved: 2021-03-25

## 2021-03-25 PROBLEM — O99.119 THROMBOCYTOPENIA AFFECTING PREGNANCY (HCC): Status: RESOLVED | Noted: 2021-03-23 | Resolved: 2021-03-25

## 2021-03-25 PROCEDURE — 65270000029 HC RM PRIVATE

## 2021-03-25 PROCEDURE — 74011250637 HC RX REV CODE- 250/637: Performed by: OBSTETRICS & GYNECOLOGY

## 2021-03-25 RX ADMIN — IBUPROFEN 600 MG: 600 TABLET, FILM COATED ORAL at 20:54

## 2021-03-25 RX ADMIN — IBUPROFEN 600 MG: 600 TABLET, FILM COATED ORAL at 09:05

## 2021-03-25 RX ADMIN — PRENATAL VIT W/ FE FUMARATE-FA TAB 27-0.8 MG 1 TABLET: 27-0.8 TAB at 09:05

## 2021-03-25 NOTE — LACTATION NOTE
This note was copied from a baby's chart. Lactation visit. In to check on feeds. Mom reports baby latching fairly well. Mom with questions about latch given larger nipple size. Discussed deep latch, waiting for baby to open wide. Baby sucking on pacifier so we latched baby to the breast in football hold. Showed mom supportive technique. Baby latched well. Didn't stay on long as didn't seem overly interested but he did latch fine. Mom reassured. Offered to come at next feed to observe full feeding if desired. Keep feeding on demand, reviewed normalcy of cluster feeding.

## 2021-03-26 VITALS
OXYGEN SATURATION: 98 % | HEART RATE: 78 BPM | TEMPERATURE: 98.2 F | HEIGHT: 62 IN | RESPIRATION RATE: 18 BRPM | DIASTOLIC BLOOD PRESSURE: 73 MMHG | SYSTOLIC BLOOD PRESSURE: 115 MMHG | WEIGHT: 187 LBS | BODY MASS INDEX: 34.41 KG/M2

## 2021-03-26 PROCEDURE — 2709999900 HC NON-CHARGEABLE SUPPLY

## 2021-03-26 PROCEDURE — 74011250637 HC RX REV CODE- 250/637: Performed by: OBSTETRICS & GYNECOLOGY

## 2021-03-26 RX ORDER — IBUPROFEN 800 MG/1
600 TABLET ORAL
Qty: 60 TAB | Refills: 0 | Status: SHIPPED | OUTPATIENT
Start: 2021-03-26 | End: 2021-09-22 | Stop reason: ALTCHOICE

## 2021-03-26 RX ORDER — SUMATRIPTAN 100 MG/1
100 TABLET, FILM COATED ORAL
Qty: 9 TAB | Refills: 2 | Status: SHIPPED | OUTPATIENT
Start: 2021-03-26 | End: 2021-03-26

## 2021-03-26 RX ADMIN — PRENATAL VIT W/ FE FUMARATE-FA TAB 27-0.8 MG 1 TABLET: 27-0.8 TAB at 08:57

## 2021-03-26 RX ADMIN — IBUPROFEN 600 MG: 600 TABLET, FILM COATED ORAL at 08:57

## 2021-03-26 NOTE — DISCHARGE SUMMARY
Obstetrical Discharge Summary     Name: Linn Esposito MRN: [id number]  SSN: [id number]    YOB: 1995  Age: 31 y.o. Sex: female      Allergies: Patient has no known allergies. Admit Date: 3/23/2021    Discharge Date: 3/26/2021     Admitting Physician: Kelsey Rasmussen MD     Attending Physician:  Lu Hurst MD     * Admission Diagnoses: Abdominal pain during pregnancy in third trimester [O26.893, R10.9]; Previous  section [V25.306]; Elevated BP without diagnosis of hypertension [R03.0]; Thrombocytopenia affecting pregnancy ([facility] [O99.119, D69.6]; Normal labor [O80, Z37.9]    * Discharge Diagnoses:   Information for the patient's :  Сергей Sosa [[id number]]   Delivery of a 7 lb 1.4 oz (3.215 kg) male infant via , Spontaneous on 3/24/2021 at 9:43 AM  by Ardyth Seip. Apgars were 9  and 9 . Additional Diagnoses:   Hospital Problems as of 3/26/2021 Date Reviewed: 3/3/2021          Codes Class Noted - Resolved POA    Elevated BP without diagnosis of hypertension ICD-10-CM: R03.0  ICD-9-CM: 796.2  3/23/2021 - Present Unknown        * (Principal) Normal labor ICD-10-CM: O80, Z37.9  ICD-9-CM: 251  3/23/2021 - Present Unknown        Thrombocytopenia ([facility] ICD-10-CM: D69.6  ICD-9-CM: 287.5  Unknown - Present Yes    Overview Addendum 3/20/2021  5:32 PM by Lu Hurst MD     Was unable to get epidural w/ G1   The rest of her HELLP labs were wnl at time of hospitalization for delivery of G1. No P:C or 24hr urine performed. Bps were normotensive.      3/11/19: plts 155 (Non-preg)  18:  148 (Non-preg)  18: 136 (Non-preg)  16: 138 (Non-preg)  9/1/15: 153 (Non-preg)  8/5/15: 76  (date of G1 delivery)   8/5/15: 71  8/4/15: 80  8/3/15:  85  7/28/15: 85  4/16/15: 124   14:  155 (Non-preg)   2020:  158 (preg intake)   2020: 122   21: 125  2021:  134  2/10/2021:  111  3/19/2021:  122       Baseline HELLP labs wnl  Baseline P:C ratio 0.2               Supervision of high risk pregnancy, antepartum ICD-10-CM: O09.90  ICD-9-CM: V23.9  2020 - Present Yes        History of  delivery ICD-10-CM: Z98.891  ICD-9-CM: V45.89  2020 - Present Yes    Overview Signed 2020  6:02 PM by Lori Rubio MD     G1 LTCS 2015 at 37w4d s/p PROM w/ Dr. Lima Watson (6#11) due to arrest of dilation (SVE 1-2cm) and worsening Thrombocytopenia  2 layer closure  Considering TOLAC               RESOLVED: Previous  section ICD-10-CM: V10.852  ICD-9-CM: V45.89  3/23/2021 - 3/25/2021 Unknown        RESOLVED: Abdominal pain during pregnancy in third trimester ICD-10-CM: O26.893, R10.9  ICD-9-CM: 646.83, 789.00  3/23/2021 - 3/25/2021 Unknown        RESOLVED: Thrombocytopenia affecting pregnancy ([facility] ICD-10-CM: O99.119, D69.6  ICD-9-CM: 649.30, 287.5  3/23/2021 - 3/25/2021 Yes             Lab Results   Component Value Date/Time    ABO/Rh(D) A POSITIVE 2021 07:43 PM    Rubella, External Immune 2020    GrBStrep, External Positive 2021    ABO,Rh A Pos 2014      Immunization History   Administered Date(s) Administered    Influenza Vaccine 2015    Tdap 2021       * Procedures: vaginal delivery             * Discharge Condition: good    * Hospital Course: Normal hospital course following the delivery. * Disposition: Home    Discharge Medications:   Current Discharge Medication List      START taking these medications    Details   ibuprofen (MOTRIN) 800 mg tablet Take 1 Tab by mouth every eight (8) hours as needed for Pain. Qty: 60 Tab, Refills: 0    Associated Diagnoses: Normal delivery at term; , delivered      SUMAtriptan (Imitrex) 100 mg tablet Take 1 Tab by mouth once as needed for Migraine for up to 1 dose.   Qty: 9 Tab, Refills: 2         CONTINUE these medications which have NOT CHANGED    Details   prenatal vit-iron fumarate-fa 27 mg iron- 0.8 mg tab tablet Take 1 Tab by mouth daily. Cetirizine (ZyrTEC) 10 mg cap Take  by mouth. ascorbic acid, vitamin C, (Vitamin C) 250 mg tablet Take 500 mg by mouth. * Follow-up Care/Patient Instructions:   Activity: No sex for 6 weeks and No driving while on analgesics  Diet: Regular Diet  Wound Care: As directed    Follow-up Information     Follow up With Specialties Details Why Contact Info    Luther Garibay MD Family Medicine   [id number] [place] [id number] [phone]

## 2021-03-26 NOTE — DISCHARGE INSTRUCTIONS
Patient Education        After Your Delivery (the Postpartum Period): Care Instructions  Your Care Instructions     Congratulations on the birth of your baby. Like pregnancy, the  period can be a time of excitement, madelaine, and exhaustion. You may look at your wondrous little baby and feel happy. You may also be overwhelmed by your new sleep hours and new responsibilities. At first, babies often sleep during the days and are awake at night. They do not have a pattern or routine. They may make sudden gasps, jerk themselves awake, or look like they have crossed eyes. These are all normal, and they may even make you smile. In these first weeks after delivery, try to take good care of yourself. It may take 4 to 6 weeks to feel like yourself again, and possibly longer if you had a  birth. You will likely feel very tired for several weeks. Your days will be full of ups and downs, but lots of madelaine as well. Follow-up care is a key part of your treatment and safety. Be sure to make and go to all appointments, and call your doctor if you are having problems. It's also a good idea to know your test results and keep a list of the medicines you take. How can you care for yourself at home? Take care of your body after delivery  · Use pads instead of tampons for the bloody flow that may last as long as 2 weeks. · Ease cramps with ibuprofen (Advil, Motrin). · Ease soreness of hemorrhoids and the area between your vagina and rectum with ice compresses or witch hazel pads. · Ease constipation by drinking lots of fluid and eating high-fiber foods. Ask your doctor about over-the-counter stool softeners. · Cleanse yourself with a gentle squeeze of warm water from a bottle instead of wiping with toilet paper. · Take a sitz bath in warm water several times a day. · Wear a good nursing bra. Ease sore and swollen breasts with warm, wet washcloths.   · If you are not breastfeeding, use ice rather than heat for breast soreness. · Your period may not start for several months if you are breastfeeding. You may bleed more, and longer at first, than you did before you got pregnant. · Wait until you are healed (about 4 to 6 weeks) before you have sexual intercourse. Your doctor will tell you when it is okay to have sex. · Try not to travel with your baby for 5 or 6 weeks. If you take a long car trip, make frequent stops to walk around and stretch. Avoid exhaustion  · Rest every day. Try to nap when your baby naps. · Ask another adult to be with you for a few days after delivery. · Plan for  if you have other children. · Stay flexible so you can eat at odd hours and sleep when you need to. Both you and your baby are making new schedules. · Plan small trips to get out of the house. Change can make you feel less tired. · Ask for help with housework, cooking, and shopping. Remind yourself that your job is to care for your baby. Know about help for postpartum depression  · \"Baby blues\" are common for the first 1 to 2 weeks after birth. You may cry or feel sad or irritable for no reason. · Rest whenever you can. Being tired makes it harder to handle your emotions. · Go for walks with your baby. · Talk to your partner, friends, and family about your feelings. · If your symptoms last for more than a few weeks, or if you feel very depressed, ask your doctor for help. · Postpartum depression can be treated. Support groups and counseling can help. Sometimes medicine can also help. Stay healthy  · Eat healthy foods so you have more energy and lose extra baby pounds. · If you breastfeed, avoid drugs. If you quit smoking during pregnancy, try to stay smoke-free. If you choose to have a drink now and then, have only one drink, and limit the number of occasions that you have a drink. Wait to breastfeed at least 2 hours after you have a drink to reduce the amount of alcohol the baby may get in the milk.   · Start daily exercise after 4 to 6 weeks, but rest when you feel tired. · Learn exercises to tone your belly. Do Kegel exercises to regain strength in your pelvic muscles. You can do these exercises while you stand or sit. ? Squeeze the same muscles you would use to stop your urine. Your belly and thighs should not move. ? Hold the squeeze for 3 seconds, and then relax for 3 seconds. ? Start with 3 seconds. Then add 1 second each week until you are able to squeeze for 10 seconds. ? Repeat the exercise 10 to 15 times for each session. Do three or more sessions each day. · Find a class for new mothers and new babies that has an exercise time. · If you had a  birth, give yourself a bit more time before you exercise, and be careful. When should you call for help? Call  911 anytime you think you may need emergency care. For example, call if:    · You have thoughts of harming yourself, your baby, or another person.     · You passed out (lost consciousness).     · You have chest pain, are short of breath, or cough up blood.     · You have a seizure. Call your doctor now or seek immediate medical care if:    · You have severe vaginal bleeding. This means you are passing blood clots and soaking through a pad each hour for 2 or more hours.     · You are dizzy or lightheaded, or you feel like you may faint.     · You have a fever.     · You have new or more belly pain.     · You have signs of a blood clot in your leg (called a deep vein thrombosis), such as:  ? Pain in the calf, back of the knee, thigh, or groin. ? Redness and swelling in your leg or groin.     · You have signs of preeclampsia, such as:  ? Sudden swelling of your face, hands, or feet. ? New vision problems (such as dimness, blurring, or seeing spots). ? A severe headache.    Watch closely for changes in your health, and be sure to contact your doctor if:    · Your vaginal bleeding seems to be getting heavier.     · You have new or worse vaginal discharge.     · You feel sad, anxious, or hopeless for more than a few days.     · You do not get better as expected. Where can you learn more? Go to http://malick-kendra.info/  Enter A461 in the search box to learn more about \"After Your Delivery (the Postpartum Period): Care Instructions. \"  Current as of: February 11, 2020               Content Version: 12.6  © 2006-2020 [employer]. Care instructions adapted under license by [employer] (which disclaims liability or warranty for this information). If you have questions about a medical condition or this instruction, always ask your healthcare professional. [place] [address] any warranty or liability for your use of this information. DISCHARGE SUMMARY from Nurse    PATIENT INSTRUCTIONS:    After general anesthesia or intravenous sedation, for 24 hours or while taking prescription Narcotics:  · Limit your activities  · Do not drive and operate hazardous machinery  · Do not make important personal or business decisions  · Do  not drink alcoholic beverages  · If you have not urinated within 8 hours after discharge, please contact your surgeon on call. Report the following to your surgeon:  · Excessive pain, swelling, redness or odor of or around the surgical area  · Temperature over 100.5  · Nausea and vomiting lasting longer than 4 hours or if unable to take medications  · Any signs of decreased circulation or nerve impairment to extremity: change in color, persistent  numbness, tingling, coldness or increase pain  · Any questions    What to do at Home:    Nothing in the vagina for 6 weeks. Call MD if experiencing heavy vaginal bleeding greater than 1 pad per hour, temperature over 100.4, foul smelling vaginal discharge, thoughts of suicide or homicide, symptoms of postpartum depression or mastitis, or any other major medical concerns.           *  Please give a list of your current medications to your Primary Care Provider. *  Please update this list whenever your medications are discontinued, doses are      changed, or new medications (including over-the-counter products) are added. *  Please carry medication information at all times in case of emergency situations. These are general instructions for a healthy lifestyle:    No smoking/ No tobacco products/ Avoid exposure to second hand smoke  Surgeon General's Warning:  Quitting smoking now greatly reduces serious risk to your health. Obesity, smoking, and sedentary lifestyle greatly increases your risk for illness    A healthy diet, regular physical exercise & weight monitoring are important for maintaining a healthy lifestyle    You may be retaining fluid if you have a history of heart failure or if you experience any of the following symptoms:  Weight gain of 3 pounds or more overnight or 5 pounds in a week, increased swelling in our hands or feet or shortness of breath while lying flat in bed. Please call your doctor as soon as you notice any of these symptoms; do not wait until your next office visit. The discharge information has been reviewed with the patient. The patient verbalized understanding. Discharge medications reviewed with the patient and appropriate educational materials and side effects teaching were provided.   ___________________________________________________________________________________________________________________________________

## 2021-03-26 NOTE — LACTATION NOTE
In to follow up with mom and infant prior to discharge to home. Experienced mom had infant latched and nursing on her left breat when I entered the room. She did state that she had struggled to get him to latch after having introduced the artificial nipple. Aware no to wait a minimum of 2 weeks to reintroduce it. After several minutes of good rhythmic sucking infant came off the breast content. Mom then burped infant and I assisted her with latching infant on her right breast in the football hold. Infant latched easily and started to suck rhythmically. Reviewed positioning and answered questions. Reviewed discharge information as well. Mom and infant are following up with [facility] and will see lactation consultant there.

## 2021-03-26 NOTE — PROGRESS NOTES
2318: Dr. Barbara Borjas notified pt would like epid. 2322: Pt positioned for procedure  2323: Dr. Barbara Borjas at bedside. Procedure explained. Pt states understanding. 2332: Time out performed  2339: Test dose given. Pulse ox applied.    2341: Ephedrine given  2342: Pt tolerated well and assisted to left tilt position
Admission assessment complete as noted. Patient oriented to room and unit. Plan of care reviewed and patient verbalizes understanding. Questions encouraged and answered. Patent encouraged to call for needs or concerns. Safety Teaching reviewed:   1. Hand hygiene prior to handling the infant. 2. Use of bulb syringe. 3. Bracelets with matching numbers are placed on mother and infant  4. An infant security tag  [facility]) is placed on the infant's ankle and monitored  5. All OB nurses wear pink Employee badges - do not give your baby to anyone without proper identification. 6. Never leave the baby alone in the room. 7. The infant should be placed on their back to sleep. on a firm mattress. No toys should be placed in the crib. (safe sleep video offered to view)  8. Never shake the baby (video offered to view)  9. Infant fall prevention - do not sleep with the baby, and place the baby in the crib while ambulating. 8. Mother and Baby Care booklet given to Mother.
Assisted pt to bathroom. Voided 350 cc urine. Pt denies needs.
Bedside report received from Brayan Dwyer RN. Care assumed.
Bedside report received from Britt Apple RN. Patient care assumed.
Bedside report received from Claudia Rubio RN. Patient care assumed.
Bedside report received from Isha He RN. Patient care assumed.
Bedside report received from Oziel Nina RN. Patient care assumed.
Bedside report received from Salud Delgadillo RN. Patient care assumed.
Chart reviewed - anxiety. SW met with patient/ while social distancing w/appropriate PPE. Patient denies any history of postpartum depression/anxiety. Per patient, \"I've had some anxiety, but it's well managed. Patient given informational packet on  mood & anxiety disorders (resources/education). Family denies any additional needs from  at this time. Family has 's contact information should any needs/questions arise.     SUGAR Scott-ELADIA  [facility]   [phone]
Dr Jeronimo Sapp at bedside. Strip reviewed. SVE 8/80/-2. AROM large amt clear fluid.
Dr. Eliodoro Soulier as documented. Pt admitted for 430 for TOLAC. Report to ANTONY Berg RN.
I&O cath for 700 cc urine. Pt repositioned to throne position.
Patient OOB to bathroom. Voids 600 mL which would count as 2nd collected void. First void in L&D. IV removed per orders.
Patient discharged to home per MD orders. Discharge instructions reviewed with patient. Questions encouraged and answered. Patient verbalizes understanding.         Patient to call out when ready to be escorted out
Patient escorted by [facility]U staff to private vehicle. Stable at discharge.
Post-Partum Day Number 2 Progress/Discharge Note  Ina Rodríguez  [id number]    Patient doing well post-partum. Voiding without difficulty, normal lochia, positive flatus. Having HAs and has hx migraines. Was on amivig before pregnancy. She says today she feels \"off balance\" which is what she feels like with a migraine. Does not usually have nausea with her migraines. Denies visual changes, dizziness. Vitals:    Patient Vitals for the past 8 hrs:   BP Temp Pulse Resp SpO2   21 0641 109/60 98.4 °F (36.9 °C) 65 18 98 %     Temp (24hrs), Av.4 °F (36.9 °C), Min:98.3 °F (36.8 °C), Max:98.4 °F (36.9 °C)      Vital signs stable, afebrile. Exam:  Patient without distress. Abdomen soft, fundus firm at level of umbilicus, nontender              Labs: No results found for this or any previous visit (from the past 24 hour(s)). Assessment and Plan:  Patient appears to be having uncomplicated post-partum course. Continue routine perineal care and maternal education. Plan discharge for today with follow up in our office in 6 weeks. Will give rx imitrex. D/w pt it is compatible with nursing.
Problem: Vaginal Delivery: Postpartum 2  Goal: Activity/Safety  Outcome: Resolved/Met  Goal: Nutrition/Diet  Outcome: Resolved/Met  Goal: Discharge Planning  Outcome: Resolved/Met  Goal: Medications  Outcome: Resolved/Met  Goal: Treatments/Interventions/Procedures  Outcome: Resolved/Met  Goal: Psychosocial  Outcome: Resolved/Met
Progress Note                               Patient: Yasir Herrera MRN: [id number]  SSN: [id number]    YOB: 1995  Age: 31 y.o. Sex: female      Postpartum Day Number 1    Subjective:     Patient doing well postpartum without significant complaints. Voiding without difficulty. Patient reports normal lochia. . Breastfeeding: Yes     Objective:     Patient Vitals for the past 18 hrs:   Temp Pulse Resp BP SpO2   21 1100    127/67    21 0707 98.4 °F (36.9 °C) 65 18 111/71 97 %   21 2258 98.3 °F (36.8 °C) 79 18 121/69 97 %        Temp (24hrs), Av.4 °F (36.9 °C), Min:98.3 °F (36.8 °C), Max:98.4 °F (36.9 °C)      Physical Exam:    General:   Patient without distress. Abdomen: Soft, fundus firm at level of umbilicus, nontender   Lower Extremities: Negative for swelling, cords, or tenderness. Lab/Data Review:  CBC:    Recent Labs     21   WBC 7.6   HGB 12.9   HCT 40.2   *     Blood Type:   Lab Results   Component Value Date/Time    ABO/Rh(D) A POSITIVE 2021 07:43 PM    ABO,Rh A Pos 2014      Infant's Blood Type: Information for the patient's :  Daly Dai [[id number]]     Lab Results   Component Value Date/Time    ABO/Rh(D) O POSITIVE 2021 09:43 AM          Assessment and Plan:     Patient appears to be having an uncomplicated postpartum course. Continue routine perineal care and maternal education.     Elizabeth Retana MD  2:33 PM
Pt presents to [facility] with c/o contractions. Triage assessment as documented. SVE. Dr. Britta Sousa will be in to assess after OR case. Pt up to walk. Will recheck cervix.
Pt repositioned to side lie and release on right side. 6093- Pt repositioned to side lie and release on left side. 8403-  SVE c/c/+2. Dr Diana Clark notified. 0935- Pt pushing with uterine contractions. 0940- Pt set up for delivery. 1196-  viable male. Apgars 9/9. Wt 7-1.     [id number]- Placenta delivered. Pitocin infusing at 999cc/hr.
SBAR OUT Report: Mother    Verbal report given to RONALD Blakely (full name & credentials) on this patient, who is now being transferred to [facility] (unit) for routine progression of care. The patient is not wearing a green \"Anesthesia-Duramorph\" band. Report consisted of patient's Situation, Background, Assessment and Recommendations (SBAR).  ID bands were compared with the identification form, and verified with the patient and receiving nurse. Information from the SBAR and the [facility] Report was reviewed with the receiving nurse; opportunity for questions and clarification provided.
SBAR report given to Neal Staton RN. Care relinquished.
SBAR report received from ANTONY Abrams RN. [facility] assumed.
SVE 8/100/-1. Bulging bag. Pt repositioned to right side.
Shift assessment complete as noted. Questions encouraged and answered. Encouraged to call for needs or concerns. Verbalizes understanding.
Subjective: Pt tolerating labor well.     Objective:    Vitals:    21 0500 21 0600 21 0700 21 0816   BP: 120/64 126/71 126/71 115/66   Pulse: 100 79 85 67   Resp:  16     Temp:  98 °F (36.7 °C)     SpO2:       Weight:       Height:           FHT's:  Baseline -140's, reactive  [place]:  Ctx q 2-4 min    SVE:  8/80/-2  Membranes:  AROM - clear    Assessement and Plan: Patsy Oliva 31 y.o.  at 39w2d admitted for labor/TOLAC    Will begin low dose pitocin augmentation     Pain control: epidural    GBS: POS - being treated      Sandra Vieyra MD    8:26 AM    21
denies pain/discomfort

## 2021-03-26 NOTE — LACTATION NOTE
Called back to room to assist with latch on the right breast. Mom placed infant to her right breast in the football hold. Infant latched and took a few sucks and came off the breast . Mom attempted a few times with same results. Suggested to mom to attempt the cross cradle and I assisted her with positioning infant. He latched and started to suck rhythmically. Informed mom that sometimes infant's prefer to lay in the same position on each breast when breastfeeding.  Infant nursed for approximately 5 minutes and came off the breast. Mom stated that she felt better about infant nursing on that breast.

## 2021-04-01 ENCOUNTER — HOSPITAL ENCOUNTER (INPATIENT)
Age: 26
LOS: 2 days | Discharge: HOME | DRG: 776 | End: 2021-04-03
Attending: OBSTETRICS & GYNECOLOGY | Admitting: OBSTETRICS & GYNECOLOGY
Payer: COMMERCIAL

## 2021-04-01 PROBLEM — R51.9 PREGNANCY HEADACHE, POSTPARTUM: Status: ACTIVE | Noted: 2021-04-01

## 2021-04-01 LAB
ALBUMIN SERPL-MCNC: 3.3 G/DL (ref 3.5–5)
ALBUMIN/GLOB SERPL: 0.9 {RATIO} (ref 1.2–3.5)
ALP SERPL-CCNC: 105 U/L (ref 50–136)
ALT SERPL-CCNC: 39 U/L (ref 12–65)
ANION GAP SERPL CALC-SCNC: 6 MMOL/L (ref 7–16)
AST SERPL-CCNC: 25 U/L (ref 15–37)
BILIRUB SERPL-MCNC: 0.4 MG/DL (ref 0.2–1.1)
BUN SERPL-MCNC: 11 MG/DL (ref 6–23)
CALCIUM SERPL-MCNC: 9.5 MG/DL (ref 8.3–10.4)
CHLORIDE SERPL-SCNC: 111 MMOL/L (ref 98–107)
CO2 SERPL-SCNC: 25 MMOL/L (ref 21–32)
CREAT SERPL-MCNC: 0.92 MG/DL (ref 0.6–1)
ERYTHROCYTE [DISTWIDTH] IN BLOOD BY AUTOMATED COUNT: 13.8 % (ref 11.9–14.6)
GLOBULIN SER CALC-MCNC: 3.5 G/DL (ref 2.3–3.5)
GLUCOSE SERPL-MCNC: 80 MG/DL (ref 65–100)
HCT VFR BLD AUTO: 41.7 % (ref 35.8–46.3)
HGB BLD-MCNC: 13 G/DL (ref 11.7–15.4)
MCH RBC QN AUTO: 23.7 PG (ref 26.1–32.9)
MCHC RBC AUTO-ENTMCNC: 31.2 G/DL (ref 31.4–35)
MCV RBC AUTO: 76 FL (ref 79.6–97.8)
NRBC # BLD: 0 K/UL (ref 0–0.2)
PLATELET # BLD AUTO: 121 K/UL (ref 150–450)
PMV BLD AUTO: 12.9 FL (ref 9.4–12.3)
POTASSIUM SERPL-SCNC: 3.7 MMOL/L (ref 3.5–5.1)
PROT SERPL-MCNC: 6.8 G/DL (ref 6.3–8.2)
RBC # BLD AUTO: 5.49 M/UL (ref 4.05–5.2)
SODIUM SERPL-SCNC: 142 MMOL/L (ref 136–145)
WBC # BLD AUTO: 5.7 K/UL (ref 4.3–11.1)

## 2021-04-01 PROCEDURE — 2709999900 HC NON-CHARGEABLE SUPPLY

## 2021-04-01 PROCEDURE — 65270000029 HC RM PRIVATE

## 2021-04-01 PROCEDURE — 99283 EMERGENCY DEPT VISIT LOW MDM: CPT | Performed by: OBSTETRICS & GYNECOLOGY

## 2021-04-01 PROCEDURE — 85027 COMPLETE CBC AUTOMATED: CPT

## 2021-04-01 PROCEDURE — 36415 COLL VENOUS BLD VENIPUNCTURE: CPT

## 2021-04-01 PROCEDURE — 80053 COMPREHEN METABOLIC PANEL: CPT

## 2021-04-01 PROCEDURE — 74011250636 HC RX REV CODE- 250/636: Performed by: OBSTETRICS & GYNECOLOGY

## 2021-04-01 RX ORDER — CALCIUM GLUCONATE 94 MG/ML
1 INJECTION, SOLUTION INTRAVENOUS AS NEEDED
Status: DISCONTINUED | OUTPATIENT
Start: 2021-04-01 | End: 2021-04-03 | Stop reason: HOSPADM

## 2021-04-01 RX ORDER — SODIUM CHLORIDE 0.9 % (FLUSH) 0.9 %
5-40 SYRINGE (ML) INJECTION AS NEEDED
Status: DISCONTINUED | OUTPATIENT
Start: 2021-04-01 | End: 2021-04-03 | Stop reason: HOSPADM

## 2021-04-01 RX ORDER — ACETAMINOPHEN 650 MG/1
650 SUPPOSITORY RECTAL
Status: DISCONTINUED | OUTPATIENT
Start: 2021-04-01 | End: 2021-04-03 | Stop reason: HOSPADM

## 2021-04-01 RX ORDER — OXYCODONE HYDROCHLORIDE 5 MG/1
5 TABLET ORAL
Status: DISCONTINUED | OUTPATIENT
Start: 2021-04-01 | End: 2021-04-03 | Stop reason: HOSPADM

## 2021-04-01 RX ORDER — ACETAMINOPHEN 325 MG/1
650 TABLET ORAL
Status: DISCONTINUED | OUTPATIENT
Start: 2021-04-01 | End: 2021-04-03 | Stop reason: HOSPADM

## 2021-04-01 RX ORDER — MAGNESIUM SULFATE HEPTAHYDRATE 40 MG/ML
2 INJECTION, SOLUTION INTRAVENOUS CONTINUOUS
Status: DISCONTINUED | OUTPATIENT
Start: 2021-04-01 | End: 2021-04-02

## 2021-04-01 RX ORDER — SODIUM CHLORIDE 0.9 % (FLUSH) 0.9 %
5-40 SYRINGE (ML) INJECTION EVERY 8 HOURS
Status: DISCONTINUED | OUTPATIENT
Start: 2021-04-01 | End: 2021-04-03 | Stop reason: HOSPADM

## 2021-04-01 RX ORDER — NIFEDIPINE 10 MG/1
10 CAPSULE ORAL ONCE
Status: DISCONTINUED | OUTPATIENT
Start: 2021-04-01 | End: 2021-04-02

## 2021-04-01 RX ORDER — ONDANSETRON 2 MG/ML
4 INJECTION INTRAMUSCULAR; INTRAVENOUS
Status: DISCONTINUED | OUTPATIENT
Start: 2021-04-01 | End: 2021-04-03 | Stop reason: HOSPADM

## 2021-04-01 RX ORDER — SODIUM CHLORIDE, SODIUM LACTATE, POTASSIUM CHLORIDE, CALCIUM CHLORIDE 600; 310; 30; 20 MG/100ML; MG/100ML; MG/100ML; MG/100ML
125 INJECTION, SOLUTION INTRAVENOUS CONTINUOUS
Status: DISCONTINUED | OUTPATIENT
Start: 2021-04-01 | End: 2021-04-03 | Stop reason: HOSPADM

## 2021-04-01 RX ORDER — PROMETHAZINE HYDROCHLORIDE 25 MG/1
12.5 TABLET ORAL
Status: DISCONTINUED | OUTPATIENT
Start: 2021-04-01 | End: 2021-04-03 | Stop reason: HOSPADM

## 2021-04-01 RX ORDER — MAGNESIUM SULFATE HEPTAHYDRATE 40 MG/ML
4 INJECTION, SOLUTION INTRAVENOUS ONCE
Status: COMPLETED | OUTPATIENT
Start: 2021-04-01 | End: 2021-04-01

## 2021-04-01 RX ORDER — NIFEDIPINE 10 MG/1
CAPSULE ORAL
Status: DISCONTINUED
Start: 2021-04-01 | End: 2021-04-01 | Stop reason: WASHOUT

## 2021-04-01 RX ORDER — SODIUM CHLORIDE, SODIUM LACTATE, POTASSIUM CHLORIDE, CALCIUM CHLORIDE 600; 310; 30; 20 MG/100ML; MG/100ML; MG/100ML; MG/100ML
75-125 INJECTION, SOLUTION INTRAVENOUS
Status: DISCONTINUED | OUTPATIENT
Start: 2021-04-01 | End: 2021-04-03 | Stop reason: HOSPADM

## 2021-04-01 RX ORDER — NIFEDIPINE 10 MG/1
CAPSULE ORAL
Status: ACTIVE
Start: 2021-04-01 | End: 2021-04-02

## 2021-04-01 RX ORDER — LABETALOL 100 MG/1
100 TABLET, FILM COATED ORAL 2 TIMES DAILY
Status: DISCONTINUED | OUTPATIENT
Start: 2021-04-02 | End: 2021-04-03 | Stop reason: HOSPADM

## 2021-04-01 RX ADMIN — MAGNESIUM SULFATE HEPTAHYDRATE 4 G: 40 INJECTION, SOLUTION INTRAVENOUS at 21:20

## 2021-04-01 RX ADMIN — SODIUM CHLORIDE, SODIUM LACTATE, POTASSIUM CHLORIDE, AND CALCIUM CHLORIDE 75 ML/HR: 600; 310; 30; 20 INJECTION, SOLUTION INTRAVENOUS at 21:19

## 2021-04-01 RX ADMIN — MAGNESIUM SULFATE HEPTAHYDRATE 2 G/HR: 40 INJECTION, SOLUTION INTRAVENOUS at 21:41

## 2021-04-02 LAB
ERYTHROCYTE [DISTWIDTH] IN BLOOD BY AUTOMATED COUNT: 13.5 % (ref 11.9–14.6)
HCT VFR BLD AUTO: 41.4 % (ref 35.8–46.3)
HGB BLD-MCNC: 13.1 G/DL (ref 11.7–15.4)
MAGNESIUM SERPL-MCNC: 5 MG/DL (ref 1.8–2.4)
MCH RBC QN AUTO: 23.9 PG (ref 26.1–32.9)
MCHC RBC AUTO-ENTMCNC: 31.6 G/DL (ref 31.4–35)
MCV RBC AUTO: 75.5 FL (ref 79.6–97.8)
NRBC # BLD: 0 K/UL (ref 0–0.2)
PLATELET # BLD AUTO: 137 K/UL (ref 150–450)
PMV BLD AUTO: 13.5 FL (ref 9.4–12.3)
RBC # BLD AUTO: 5.48 M/UL (ref 4.05–5.2)
WBC # BLD AUTO: 6.5 K/UL (ref 4.3–11.1)

## 2021-04-02 PROCEDURE — 65270000029 HC RM PRIVATE

## 2021-04-02 PROCEDURE — 74011250637 HC RX REV CODE- 250/637: Performed by: OBSTETRICS & GYNECOLOGY

## 2021-04-02 PROCEDURE — 36415 COLL VENOUS BLD VENIPUNCTURE: CPT

## 2021-04-02 PROCEDURE — 85027 COMPLETE CBC AUTOMATED: CPT

## 2021-04-02 PROCEDURE — 83735 ASSAY OF MAGNESIUM: CPT

## 2021-04-02 PROCEDURE — 99232 SBSQ HOSP IP/OBS MODERATE 35: CPT | Performed by: OBSTETRICS & GYNECOLOGY

## 2021-04-02 PROCEDURE — 74011250636 HC RX REV CODE- 250/636: Performed by: OBSTETRICS & GYNECOLOGY

## 2021-04-02 RX ORDER — DIPHENHYDRAMINE HCL 25 MG
25 CAPSULE ORAL
Status: DISCONTINUED | OUTPATIENT
Start: 2021-04-02 | End: 2021-04-03 | Stop reason: HOSPADM

## 2021-04-02 RX ORDER — IBUPROFEN 600 MG/1
600 TABLET ORAL
Status: DISCONTINUED | OUTPATIENT
Start: 2021-04-02 | End: 2021-04-03 | Stop reason: HOSPADM

## 2021-04-02 RX ORDER — FUROSEMIDE 40 MG/1
20 TABLET ORAL DAILY
Status: DISCONTINUED | OUTPATIENT
Start: 2021-04-02 | End: 2021-04-03 | Stop reason: HOSPADM

## 2021-04-02 RX ORDER — ZOLPIDEM TARTRATE 5 MG/1
5 TABLET ORAL
Status: DISCONTINUED | OUTPATIENT
Start: 2021-04-02 | End: 2021-04-03 | Stop reason: HOSPADM

## 2021-04-02 RX ADMIN — IBUPROFEN 600 MG: 600 TABLET, FILM COATED ORAL at 18:15

## 2021-04-02 RX ADMIN — ACETAMINOPHEN 650 MG: 325 TABLET ORAL at 18:15

## 2021-04-02 RX ADMIN — ACETAMINOPHEN 650 MG: 325 TABLET ORAL at 02:11

## 2021-04-02 RX ADMIN — MAGNESIUM SULFATE HEPTAHYDRATE 2 G/HR: 40 INJECTION, SOLUTION INTRAVENOUS at 07:32

## 2021-04-02 RX ADMIN — IBUPROFEN 600 MG: 600 TABLET, FILM COATED ORAL at 11:19

## 2021-04-02 RX ADMIN — ACETAMINOPHEN 650 MG: 325 TABLET ORAL at 09:08

## 2021-04-02 RX ADMIN — FUROSEMIDE 20 MG: 40 TABLET ORAL at 11:45

## 2021-04-02 RX ADMIN — OXYCODONE 5 MG: 5 TABLET ORAL at 18:14

## 2021-04-02 NOTE — PROGRESS NOTES
[facility]  [address], [place] [address], [address]  [address], [place], Geri Mchugh, RAYA-BC      Raciel Resendiz  Is a  admitted for Dx below:    Patient Active Problem List   Diagnosis Code    Thrombocytopenia ([facility] [address] D69.6    Migraines G43.909    Anxiety F41.9    Preeclampsia in postpartum period O14.95        Pt has some complaints today. She notes mild H/A this AM (known long standing Hx of migraines)  Denies any visual changes, N/V, abdom pain. Lochia nml. Denies any F/C, CP, SOB.     Patient Vitals for the past 24 hrs:   Temp Pulse Resp BP SpO2   21 1115 -- 75 18 125/79 --   21 1015 -- 83 18 126/78 --   21 0915 -- 72 18 127/86 --   21 0815 -- 61 18 122/73 100 %   21 0715 97.9 °F (36.6 °C) 63 -- (!) 111/56 96 %   21 0651 -- -- -- -- 94 %   21 0643 -- -- -- -- 94 %   21 0617 -- -- -- -- 96 %   21 0615 -- (!) 59 -- (!) 143/72 --   21 0604 -- -- -- -- 94 %   21 0556 -- -- -- -- 94 %   21 0517 -- -- -- -- 96 %   21 0515 -- 66 -- 114/61 --   21 0417 -- -- -- -- 98 %   217 -- 74 -- -- 99 %   21 0316 -- -- -- 124/65 --   21 0300 -- -- -- -- 94 %   21 0246 -- 69 -- 117/71 --   21 0244 -- -- -- -- 93 %   21 -- 60 -- -- 98 %   21 -- -- -- 119/67 --   21 -- -- -- -- 94 %   21 -- -- -- -- 94 %   21 -- 64 -- (!) 103/55 --   21 -- -- -- -- 94 %   21 -- -- -- -- 94 %   21 -- -- -- -- 94 %   21 -- 67 -- (!) 105/56 94 %   21 -- -- -- -- 94 %   21 -- 68 -- 118/70 --   21 -- 61 -- -- 98 %   21 -- -- -- 122/74 --   21 -- 63 -- -- 98 %   21 -- -- -- 122/74 --   21 -- (!) 59 -- 112/65 --   21 -- -- -- -- 100 %   21 -- 61 -- 109/64 --   21 -- -- -- -- 100 %   04/01/21 2206 -- 60 -- 109/67 --   04/01/21 2204 -- -- -- -- 98 %   04/01/21 2201 -- (!) 59 -- 101/62 --   04/01/21 2159 -- -- -- -- 100 %   04/01/21 2156 -- 63 -- (!) 100/58 --   04/01/21 2154 -- -- -- -- 100 %   04/01/21 2151 -- 62 -- (!) 92/55 --   04/01/21 2149 -- -- -- -- 100 %   04/01/21 2146 -- 70 -- (!) 95/58 --   04/01/21 2144 -- -- -- -- 100 %   04/01/21 2141 -- 65 -- 99/60 --   04/01/21 2136 -- 65 -- 100/65 97 %   04/01/21 2131 -- 63 -- 108/64 98 %   04/01/21 2126 -- 62 -- 114/64 98 %   04/01/21 2121 -- (!) 59 -- 118/71 --   04/01/21 2110 97.7 °F (36.5 °C) (!) 57 18 120/68 --   04/01/21 2108 -- 63 -- 132/64 --   04/01/21 2054 -- (!) 52 -- (!) 122/99 --   04/01/21 2034 -- (!) 52 -- (!) 171/86 --   04/01/21 2024 -- (!) 49 -- (!) 192/95 --   04/01/21 2014 -- (!) 46 -- (!) 174/98 --   04/01/21 2004 98.1 °F (36.7 °C) (!) 48 16 (!) 180/89 --     CV - RRR  Lungs - CT bilat  Abdom - soft, NT, NABS  Ext - 1+ edema bilat LEs    Recent Results (from the past 24 hour(s))   CBC W/O DIFF    Collection Time: 04/01/21  9:17 PM   Result Value Ref Range    WBC 5.7 4.3 - 11.1 K/uL    RBC 5.49 (H) 4.05 - 5.2 M/uL    HGB 13.0 11.7 - 15.4 g/dL    HCT 41.7 35.8 - 46.3 %    MCV 76.0 (L) 79.6 - 97.8 FL    MCH 23.7 (L) 26.1 - 32.9 PG    MCHC 31.2 (L) 31.4 - 35.0 g/dL    RDW 13.8 11.9 - 14.6 %    PLATELET 365 (L) 303 - 450 K/uL    MPV 12.9 (H) 9.4 - 12.3 FL    ABSOLUTE NRBC 0.00 0.0 - 0.2 K/uL   METABOLIC PANEL, COMPREHENSIVE    Collection Time: 04/01/21  9:17 PM   Result Value Ref Range    Sodium 142 136 - 145 mmol/L    Potassium 3.7 3.5 - 5.1 mmol/L    Chloride 111 (H) 98 - 107 mmol/L    CO2 25 21 - 32 mmol/L    Anion gap 6 (L) 7 - 16 mmol/L    Glucose 80 65 - 100 mg/dL    BUN 11 6 - 23 MG/DL    Creatinine 0.92 0.6 - 1.0 MG/DL    GFR est AA >60 >60 ml/min/1.73m2    GFR est non-AA >60 >60 ml/min/1.73m2    Calcium 9.5 8.3 - 10.4 MG/DL    Bilirubin, total 0.4 0.2 - 1.1 MG/DL    ALT (SGPT) 39 12 - 65 U/L    AST (SGOT) 25 15 - 37 U/L    Alk. phosphatase 105 50 - 136 U/L    Protein, total 6.8 6.3 - 8.2 g/dL    Albumin 3.3 (L) 3.5 - 5.0 g/dL    Globulin 3.5 2.3 - 3.5 g/dL    A-G Ratio 0.9 (L) 1.2 - 3.5     MAGNESIUM    Collection Time: 21  2:01 AM   Result Value Ref Range    Magnesium 5.0 (H) 1.8 - 2.4 mg/dL         Current Facility-Administered Medications:     ibuprofen (MOTRIN) tablet 600 mg, 600 mg, Oral, Q6H PRN, Patrick Alvarez MD, 600 mg at 21 1119    furosemide (LASIX) tablet 20 mg, 20 mg, Oral, DAILY, Patrick Alvarez MD, 20 mg at 21 1145    lactated Ringers infusion,  mL/hr, IntraVENous, TITRATE, See BOND MD, Last Rate: 75 mL/hr at 21, 75 mL/hr at 21    calcium gluconate injection 1 g, 1 g, IntraVENous, PRN, Richie Villasenor MD    lactated Ringers infusion, 125 mL/hr, IntraVENous, CONTINUOUS, Richie Villasenor MD    sodium chloride (NS) flush 5-40 mL, 5-40 mL, IntraVENous, Q8H, Rihcie Villasenor MD    sodium chloride (NS) flush 5-40 mL, 5-40 mL, IntraVENous, PRN, Richie Villasenor MD    promethazine (PHENERGAN) tablet 12.5 mg, 12.5 mg, Oral, Q6H PRN **OR** ondansetron (ZOFRAN) injection 4 mg, 4 mg, IntraVENous, Q6H PRN, Richie Villasenor MD    acetaminophen (TYLENOL) tablet 650 mg, 650 mg, Oral, Q6H PRN, 650 mg at 21 0908 **OR** acetaminophen (TYLENOL) suppository 650 mg, 650 mg, Rectal, Q6H PRN, Richie Villasenor MD    [Held by provider] labetaloL (NORMODYNE) tablet 100 mg, 100 mg, Oral, BID, See BOND MD, Stopped at 21 0900    oxyCODONE IR (ROXICODONE) tablet 5 mg, 5 mg, Oral, Q4H PRN, Richie Villasenor MD    A/P:  1. PPD #10  2. PP pre-eclampsia --- BP signif improved, labs stable, feeling much better. Pt desires D/C to be home with . D/W pt that I will recheck CBC (thrombocytopenia), stop Mag and watch BPs for remainder of afternoon/early evening.   If cont to be stable with nml BPs, will consider D/C late today or early Maeve Naranjo MD  1:05 PM  04/02/21

## 2021-04-02 NOTE — PROGRESS NOTES
AM assessment completed. Pt provided with more breast milk bottles / lables / lanolin cream. Denies any further needs at present. Complains of headache that \"comes and goes\".  Instructed to call RN if any changes in signs / symptoms

## 2021-04-02 NOTE — PROGRESS NOTES
Dr Mabel Monzon on phone: B/p 134/85 p 57 pt continues to have a headache and has not slept. Dr Mabel Monzon states he would like her to stay and will order her an Ambien and or benadryl for sleep and he will see her in the morning.

## 2021-04-02 NOTE — PROGRESS NOTES
Bolus completed and Maintenance dose of 2 gm per hour hung. Pt assisted up to bathroom. Voided. Instructed on intake and output recording and instructed on pumping. Pt states feeling better.

## 2021-04-02 NOTE — PROGRESS NOTES
Patient presents to triage with complaints of headache x 3 days, and elevated BP at home. Patient delivered on 3/24/21 via .

## 2021-04-02 NOTE — PROGRESS NOTES
04/01/21 2004 04/01/21 2014   Maternal Vital Signs   Temp 98.1 °F (36.7 °C)  --    Temp Source Oral  --    Pulse (Heart Rate) (!) 48 (!) 46   Resp Rate 16  --    Level of Consciousness Alert  --    BP (!) 180/89 (!) 174/98   MAP (Monitor) 115 116   MAP (Calculated) 119 123   MEWS Score 2  --      BP cycling every 10 min.

## 2021-04-02 NOTE — DISCHARGE INSTRUCTIONS
Patient Education        Learning About Preeclampsia After Childbirth  What is preeclampsia? A woman with preeclampsia has blood pressure that is higher than usual. She may also have other serious symptoms. Preeclampsia can be dangerous. When it is severe, it can cause seizures (eclampsia) or liver or kidney damage. When the liver is affected, some women get HELLP syndrome, a blood-clotting and bleeding problem. HELLP can come on quickly and can be deadly. This is why your doctor checks you and your baby often. Preeclampsia usually occurs after 20 weeks of pregnancy. Most often, it starts near the end of pregnancy and goes away after childbirth. But symptoms may last a few weeks or more and can get worse after delivery. Rarely, symptoms of preeclampsia don't show up until days or even weeks after childbirth. What are the symptoms? Mild preeclampsia usually doesn't cause symptoms. But preeclampsia can cause rapid weight gain and sudden swelling of the hands and face. Severe preeclampsia does cause symptoms. It can cause a very bad headache and trouble seeing and breathing. It also can cause belly pain. You may also urinate less than usual.  If you have new preeclampsia symptoms after you go home from the hospital, call your doctor right away. What can you expect after you have had preeclampsia? In the hospital  After the baby and the placenta are delivered, preeclampsia usually starts to improve. Most women get better in the first few days after childbirth. After having preeclampsia, you still have a risk of seizures for a day or more after childbirth. (Very rarely, seizures happen later on.) So your doctor may have you take magnesium sulfate for a day or more to prevent seizures. You may also take medicine to lower your blood pressure. When you go home  Your blood pressure will most likely return to normal a few days after delivery.  Your doctor will want to check your blood pressure sometime in the first week after you leave the hospital.  Some women still have high blood pressure 6 weeks after childbirth. But most return to normal levels over the long term. · Take and record your blood pressure at home if your doctor tells you to. ? Learn the importance of the two measures of blood pressure (such as 120 over 80, or 120/80). The first number is the systolic pressure. This is the force of blood on the artery walls as the heart pumps. The second number is the diastolic pressure. This is the force of blood on the artery walls between heartbeats, when the heart is at rest. You have a choice of monitors to use. § Manual monitor: You pump up the cuff and use a stethoscope to listen for your pulse. § Electronic monitor: The cuff inflates, and a gauge shows your pulse rate. ? To take your blood pressure:  § Ask your doctor to check your blood pressure monitor to be sure that it is accurate and that the cuff fits you. Also ask your doctor to watch you use it, to make sure that you are using it right. § You should not eat, use tobacco products, or use medicine known to raise blood pressure (such as some nasal decongestant sprays) before you take your blood pressure. § Avoid taking your blood pressure if you have just exercised. Also avoid taking it if you are nervous or upset. Rest at least 15 minutes before you take your blood pressure. · Be safe with medicines. If you take medicine, take it exactly as prescribed. Call your doctor if you think you are having a problem with your medicine. · Do not smoke. Quitting smoking will help improve your baby's growth and health. If you need help quitting, talk to your doctor about stop-smoking programs and medicines. These can increase your chances of quitting for good. · Eat a balanced and healthy diet that has lots of fruits and vegetables.   Long-term health  After you have had preeclampsia, you have a higher-than-average risk of heart disease, stroke, and kidney disease. This may be because the same things that cause preeclampsia also cause heart and kidney disease. To protect your health, work with your doctor on living a heart-healthy lifestyle and getting the checkups you need. Your doctor may also want you to check your blood pressure at home. Follow-up care is a key part of your treatment and safety. Be sure to make and go to all appointments, and call your doctor if you are having problems. It's also a good idea to know your test results and keep a list of the medicines you take. Where can you learn more? Go to http://www.ramirez.com/  Enter Q718 in the search box to learn more about \"Learning About Preeclampsia After Childbirth. \"  Current as of: October 8, 2020               Content Version: 12.8  © 2006-2021 [employer]. Care instructions adapted under license by [employer] (which disclaims liability or warranty for this information). If you have questions about a medical condition or this instruction, always ask your healthcare professional. [address] any warranty or liability for your use of this information.

## 2021-04-02 NOTE — H&P
Chief Complaint   Patient presents with    Hypertension       31 y.o. female P2 1 week postpartum from . Has had headache for 3 days without relief. No vision symptoms. Did not have any significant issues with pregnancy.      HISTORY:  OB History    Para Term  AB Living   2 2 2     2   SAB TAB Ectopic Molar Multiple Live Births           0 2      # Outcome Date GA Lbr Jermain/2nd Weight Sex Delivery Anes PTL Lv   2 Term 21 39w2d 18:28 / 00:15 3.215 kg M  EPI N TAD   1 Term 08/05/15 37w4d  3.04 kg M  LO GENERAL AN N TAD      Complications: Failure to Progress in First Stage, Other (comment)       Past Surgical History:   Procedure Laterality Date    HX  SECTION  2015    HX WISDOM TEETH EXTRACTION         Past Medical History:   Diagnosis Date    Anemia affecting pregnancy in second trimester 2015    Anxiety     Hypertension     Iron deficiency anemia     Migraines     Ovarian cyst     Thrombocytopenia (HCC)        No Known Allergies    Family History   Problem Relation Age of Onset    Other Mother         anemia    Diabetes Father     No Known Problems Sister     No Known Problems Brother     Breast Cancer Maternal Aunt     Diabetes Maternal Aunt     No Known Problems Maternal Uncle     No Known Problems Paternal Aunt     No Known Problems Paternal Uncle     No Known Problems Maternal Grandmother     No Known Problems Maternal Grandfather     Hypertension Paternal Grandmother     Diabetes Paternal Grandmother     No Known Problems Paternal Grandfather        Social History     Socioeconomic History    Marital status: SINGLE     Spouse name: Not on file    Number of children: Not on file    Years of education: Not on file    Highest education level: Not on file   Occupational History    Not on file   Social Needs    Financial resource strain: Not on file    Food insecurity     Worry: Not on file     Inability: Not on file   Aarti Olsen Transportation needs     Medical: Not on file     Non-medical: Not on file   Tobacco Use    Smoking status: Never Smoker    Smokeless tobacco: Never Used   Substance and Sexual Activity    Alcohol use: Not Currently     Comment: occ    Drug use: Never    Sexual activity: Yes     Partners: Male     Birth control/protection: None   Lifestyle    Physical activity     Days per week: Not on file     Minutes per session: Not on file    Stress: Not on file   Relationships    Social connections     Talks on phone: Not on file     Gets together: Not on file     Attends Jew service: Not on file     Active member of club or organization: Not on file     Attends meetings of clubs or organizations: Not on file     Relationship status: Not on file    Intimate partner violence     Fear of current or ex partner: Not on file     Emotionally abused: Not on file     Physically abused: Not on file     Forced sexual activity: Not on file   Other Topics Concern    Not on file   Social History Narrative    Not on file       ROS:  Negative:   negative 10 point ROS except as noted in HPI    Positive:   per hpi    PHYSICAL EXAM:  Blood pressure (!) 171/86, pulse (!) 52, temperature 98.1 °F (36.7 °C), resp. rate 16, last menstrual period 06/22/2020, unknown if currently breastfeeding. The patient appears well, alert, oriented x 3. Appropriate affect. Lungs are clear. Heart RRR, no murmurs. Abdomen soft, non-tender, no rebound/guarding, normoactive bs. Fundus soft and non tender  Skin warm, dry, no rashes  Ext no edema, DTR's brisk and 3+, 1 beat clonus bilaterally    I have personally reviewed the patient's history, prenatal record, and pertinent test results. vital sign trends, laboratory results, previous provider notes support my clinical impression. Assessment:  31 y.o. female  P2 1 week postpartum with new onset headache and severe range pressures  Plan:  Initiated hypertension protocol.  Admit for mag, labs, bp mgmt.     Signed By:  Shilpi Medellin MD     April 1, 2021

## 2021-04-02 NOTE — PROGRESS NOTES
Mg 4 gm bolus hung. Pt denies blurred vision or flashes of light. No epigastric tenderness. No clonus but does have a headache. Instructed regarding medication side effects and need for treatment of blood pressures. Answered questions. Pt tolerating well.

## 2021-04-02 NOTE — ROUTINE PROCESS
Procardia 10 mg PO given on admission due to severe range pressures on admission to [facility]. Pt veins difficult to see or palpate. CRNA called to start IV and draw labs.

## 2021-04-02 NOTE — PROGRESS NOTES
Magnesium infusion discontinued. Lasix PO given as ordered. See MAR. Instructed pt to call RN with any changes in signs / symptoms. Pt verbalized understanding.

## 2021-04-02 NOTE — PROGRESS NOTES
Call from nursing that pt H/A has returned. Denies any visual changes, N/V, abdom pain. Patient Vitals for the past 8 hrs:   Temp Pulse Resp BP   04/02/21 1733 -- (!) 57 -- 134/85   04/02/21 1533 97.8 °F (36.6 °C) (!) 57 18 (!) 141/78   04/02/21 1325 97.9 °F (36.6 °C) 74 18 126/86   04/02/21 1115 -- 75 18 125/79   04/02/21 1015 -- 83 18 126/78       Recent Results (from the past 12 hour(s))   CBC W/O DIFF    Collection Time: 04/02/21  2:35 PM   Result Value Ref Range    WBC 6.5 4.3 - 11.1 K/uL    RBC 5.48 (H) 4.05 - 5.2 M/uL    HGB 13.1 11.7 - 15.4 g/dL    HCT 41.4 35.8 - 46.3 %    MCV 75.5 (L) 79.6 - 97.8 FL    MCH 23.9 (L) 26.1 - 32.9 PG    MCHC 31.6 31.4 - 35.0 g/dL    RDW 13.5 11.9 - 14.6 %    PLATELET 086 (L) 266 - 450 K/uL    MPV 13.5 (H) 9.4 - 12.3 FL    ABSOLUTE NRBC 0.00 0.0 - 0.2 K/uL     BP slightly elevated again. D/W pt that I would feel more comfortable watching her BPs in-house overnight.   She is amenable to this plan    Dean Harrison MD  5:48 PM  04/02/21

## 2021-04-02 NOTE — LACTATION NOTE
Lactation visit. Mom readmitted last night for high BP, has been on magnesium. Exclusively breastfeeding, pumping now during hospital stay. Good milk volume. Mom reports nipples had been sore and some damage. No pain with pump. Mom states ped told them baby had tongue tie at first ped visit but mom states she will likely not revise it. Discussed function of tongue for feeding, range of motion and oral restriction. May be cause of nipple pain. Will need to watch that closely. Can pump in place of direct nursing as desired. Reviewed supply and demand. Importance of milk removal for milk supply maintenance. Questions answered.

## 2021-04-03 ENCOUNTER — HOSPITAL ENCOUNTER (EMERGENCY)
Age: 26
Discharge: HOME | End: 2021-04-03
Attending: OBSTETRICS & GYNECOLOGY | Admitting: OBSTETRICS & GYNECOLOGY
Payer: COMMERCIAL

## 2021-04-03 VITALS
TEMPERATURE: 98 F | HEART RATE: 57 BPM | SYSTOLIC BLOOD PRESSURE: 129 MMHG | OXYGEN SATURATION: 99 % | DIASTOLIC BLOOD PRESSURE: 78 MMHG | RESPIRATION RATE: 16 BRPM

## 2021-04-03 VITALS — HEART RATE: 57 BPM | DIASTOLIC BLOOD PRESSURE: 78 MMHG | SYSTOLIC BLOOD PRESSURE: 155 MMHG

## 2021-04-03 PROCEDURE — 74011250637 HC RX REV CODE- 250/637: Performed by: OBSTETRICS & GYNECOLOGY

## 2021-04-03 PROCEDURE — 74011250636 HC RX REV CODE- 250/636: Performed by: OBSTETRICS & GYNECOLOGY

## 2021-04-03 PROCEDURE — 96374 THER/PROPH/DIAG INJ IV PUSH: CPT

## 2021-04-03 PROCEDURE — 99285 EMERGENCY DEPT VISIT HI MDM: CPT

## 2021-04-03 RX ORDER — HYDRALAZINE HYDROCHLORIDE 20 MG/ML
5 INJECTION INTRAMUSCULAR; INTRAVENOUS ONCE
Status: COMPLETED | OUTPATIENT
Start: 2021-04-03 | End: 2021-04-03

## 2021-04-03 RX ORDER — FUROSEMIDE 20 MG/1
TABLET ORAL
Qty: 4 TAB | Refills: 0 | Status: SHIPPED | OUTPATIENT
Start: 2021-04-03 | End: 2021-09-22 | Stop reason: ALTCHOICE

## 2021-04-03 RX ORDER — LORAZEPAM 1 MG/1
1 TABLET ORAL
Status: COMPLETED | OUTPATIENT
Start: 2021-04-03 | End: 2021-04-03

## 2021-04-03 RX ORDER — NIFEDIPINE 30 MG/1
30 TABLET, EXTENDED RELEASE ORAL DAILY
Qty: 30 TAB | Refills: 2 | Status: SHIPPED | OUTPATIENT
Start: 2021-04-03 | End: 2021-04-05 | Stop reason: DRUGHIGH

## 2021-04-03 RX ORDER — NIFEDIPINE 30 MG/1
30 TABLET, EXTENDED RELEASE ORAL
Status: COMPLETED | OUTPATIENT
Start: 2021-04-03 | End: 2021-04-03

## 2021-04-03 RX ORDER — NIFEDIPINE 30 MG/1
30 TABLET, EXTENDED RELEASE ORAL DAILY
Qty: 30 TAB | Refills: 1 | Status: SHIPPED | OUTPATIENT
Start: 2021-04-03 | End: 2021-04-05 | Stop reason: DRUGHIGH

## 2021-04-03 RX ADMIN — NIFEDIPINE 30 MG: 30 TABLET, FILM COATED, EXTENDED RELEASE ORAL at 15:24

## 2021-04-03 RX ADMIN — LORAZEPAM 1 MG: 1 TABLET ORAL at 16:41

## 2021-04-03 RX ADMIN — FUROSEMIDE 20 MG: 40 TABLET ORAL at 08:07

## 2021-04-03 RX ADMIN — HYDRALAZINE HYDROCHLORIDE 5 MG: 20 INJECTION INTRAMUSCULAR; INTRAVENOUS at 17:59

## 2021-04-03 NOTE — PROGRESS NOTES
[facility]  [address], [place] [address], [address]  [address] [facility], [place], Kary Louis RAYA-BC      Racheal Nicholas  Is a G2  admitted for Dx below:    Patient Active Problem List   Diagnosis Code    Thrombocytopenia ([facility] [address] D69.6    Migraines G43.909    Anxiety F41.9    Preeclampsia in postpartum period O14.95        Pt has no complaints today. H/A resolved. Denies any visual changes, N/V, abdom pain. Lochia wnl. Denies any CP, SOB.     Patient Vitals for the past 24 hrs:   Temp Pulse Resp BP SpO2   04/03/21 0631 -- (!) 56 -- -- --   04/03/21 0414 -- (!) 54 -- 134/89 99 %   04/02/21 2310 -- (!) 55 18 136/82 --   04/02/21 2023 -- (!) 56 -- (!) 147/86 --   04/02/21 1733 -- (!) 57 -- 134/85 --   04/02/21 1533 97.8 °F (36.6 °C) (!) 57 18 (!) 141/78 --   04/02/21 1325 97.9 °F (36.6 °C) 74 18 126/86 --   04/02/21 1115 -- 75 18 125/79 --   04/02/21 1015 -- 83 18 126/78 --   04/02/21 0915 -- 72 18 127/86 --   04/02/21 0815 -- 61 18 122/73 100 %   04/02/21 0715 97.9 °F (36.6 °C) 63 -- (!) 111/56 96 %        CV - RRR  Lungs - CT bilat  Abdom - soft, NT, NABS  Ext - tr edema bilaterally    Recent Results (from the past 24 hour(s))   CBC W/O DIFF    Collection Time: 04/02/21  2:35 PM   Result Value Ref Range    WBC 6.5 4.3 - 11.1 K/uL    RBC 5.48 (H) 4.05 - 5.2 M/uL    HGB 13.1 11.7 - 15.4 g/dL    HCT 41.4 35.8 - 46.3 %    MCV 75.5 (L) 79.6 - 97.8 FL    MCH 23.9 (L) 26.1 - 32.9 PG    MCHC 31.6 31.4 - 35.0 g/dL    RDW 13.5 11.9 - 14.6 %    PLATELET 801 (L) 215 - 450 K/uL    MPV 13.5 (H) 9.4 - 12.3 FL    ABSOLUTE NRBC 0.00 0.0 - 0.2 K/uL         Current Facility-Administered Medications:     ibuprofen (MOTRIN) tablet 600 mg, 600 mg, Oral, Q6H PRN, Pedro Martinez MD, 600 mg at 04/02/21 1815    furosemide (LASIX) tablet 20 mg, 20 mg, Oral, DAILY, Pedro Martinez MD, 20 mg at 04/02/21 1145    diphenhydrAMINE (BENADRYL) capsule 25 mg, 25 mg, Oral, Q6H PRN, Ellyn Jones MD    zolpidem [facility]) tablet 5 mg, 5 mg, Oral, QHS PRN, Mayte Romo MD    lactated Ringers infusion,  mL/hr, IntraVENous, TITRATE, Ra Hutchins MD, Last Rate: 75 mL/hr at 04/01/21 2119, 75 mL/hr at 04/01/21 2119    calcium gluconate injection 1 g, 1 g, IntraVENous, PRN, Ra Hutchins MD    lactated Ringers infusion, 125 mL/hr, IntraVENous, CONTINUOUS, Ra Hutchins MD    sodium chloride (NS) flush 5-40 mL, 5-40 mL, IntraVENous, Q8H, Ra Hutchins MD    sodium chloride (NS) flush 5-40 mL, 5-40 mL, IntraVENous, PRN, Ra Hutchins MD    promethazine (PHENERGAN) tablet 12.5 mg, 12.5 mg, Oral, Q6H PRN **OR** ondansetron (ZOFRAN) injection 4 mg, 4 mg, IntraVENous, Q6H PRN, Ra Hutchins MD    acetaminophen (TYLENOL) tablet 650 mg, 650 mg, Oral, Q6H PRN, 650 mg at 04/02/21 1815 **OR** acetaminophen (TYLENOL) suppository 650 mg, 650 mg, Rectal, Q6H PRN, Ra Hutchins MD    [Held by provider] labetaloL (NORMODYNE) tablet 100 mg, 100 mg, Oral, BID, Virgilio BOND MD, Stopped at 04/02/21 0900    oxyCODONE IR (ROXICODONE) tablet 5 mg, 5 mg, Oral, Q4H PRN, Virgilio BOND MD, 5 mg at 04/02/21 1814    A/P:  1.  PP pre-eclampsia -- BP stable, neuro symptoms resolved. Routine PP and pre-E precautions. Pt with short F/U with [facility] for BP check already scheduled.     Lori Richter MD  7:12 AM  04/03/21

## 2021-04-03 NOTE — DISCHARGE INSTRUCTIONS
Patient Education        Postpartum: Care Instructions  Your Care Instructions  After childbirth (postpartum period), your body goes through many changes. Some of these changes happen over several weeks. In the hours after delivery, your body will begin to recover from childbirth while it prepares to breastfeed your . You may feel emotional during this time. Your hormones can shift your mood without warning for no clear reason. In the first couple of weeks after childbirth, many women have emotions that change from happy to sad. You may find it hard to sleep. You may cry a lot. This is called the \"baby blues. \" These overwhelming emotions often go away within a couple of days or weeks. But it's important to discuss your feelings with your doctor. It is easy to get too tired and overwhelmed during the first weeks after childbirth. Don't try to do too much. Get rest whenever you can, accept help from others, and eat well and drink plenty of fluids. In the first couple of weeks after giving birth, your doctor or midwife may want to check in with you and make a plan for any follow-up care you may need. You will likely have a complete postpartum visit in the first 3 months after delivery. At that time, your doctor or midwife will check on your recovery from childbirth. He or she will also see how you are doing with your emotions and talk about your concerns or questions. Follow-up care is a key part of your treatment and safety. Be sure to make and go to all appointments, and call your doctor if you are having problems. It's also a good idea to know your test results and keep a list of the medicines you take. How can you care for yourself at home? · Sleep or rest when your baby sleeps. · Get help with household chores from family or friends, if you can. Do not try to do it all yourself. · If you have hemorrhoids or swelling or pain around the opening of your vagina, try using cold and heat.  You can put ice or a cold pack on the area for 10 to 20 minutes at a time. Put a thin cloth between the ice and your skin. Also try sitting in a few inches of warm water (sitz bath) 3 times a day and after bowel movements. · Take pain medicines exactly as directed. ? If the doctor gave you a prescription medicine for pain, take it as prescribed. ? If you are not taking a prescription pain medicine, ask your doctor if you can take an over-the-counter medicine. · Eat more fiber to avoid constipation. Include foods such as whole-grain breads and cereals, raw vegetables, raw and dried fruits, and beans. · Drink plenty of fluids. If you have kidney, heart, or liver disease and have to limit fluids, talk with your doctor before you increase the amount of fluids you drink. · Do not rinse inside your vagina with fluids (douche). · If you have stitches, keep the area clean by pouring or spraying warm water over the area outside your vagina and anus after you use the toilet. · Keep a list of questions to ask your doctor or midwife. Your questions might be about:  ? Changes in your breasts, such as lumps or soreness. ? When to expect your menstrual period to start again. ? What form of birth control is best for you. ? Weight you have put on during the pregnancy. ? Exercise options. ? What foods and drinks are best for you, especially if you are breastfeeding. ? Problems you might be having with breastfeeding. ? When you can have sex. Some women may want to talk about lubricants for the vagina. ? Any feelings of sadness or restlessness that you are having. When should you call for help? Call 911 anytime you think you may need emergency care. For example, call if:    · You have thoughts of harming yourself, your baby, or another person.     · You passed out (lost consciousness).     · You have chest pain, are short of breath, or cough up blood.     · You have a seizure.    Call your doctor now or seek immediate medical care if:    · Your vaginal bleeding seems to be getting heavier.     · You are dizzy or lightheaded, or you feel like you may faint.     · You have a fever.     · You have new or more belly pain.     · You have symptoms of a blood clot in your leg (called a deep vein thrombosis), such as:  ? Pain in the calf, back of the knee, thigh, or groin. ? Redness and swelling in your leg or groin.     · You have signs of preeclampsia, such as:  ? Sudden swelling of your face, hands, or feet. ? New vision problems (such as dimness, blurring, or seeing spots). ? A severe headache. Watch closely for changes in your health, and be sure to contact your doctor if:    · You have new or worse vaginal discharge.     · You feel sad or depressed.     · You are having problems with your breasts or breastfeeding. Where can you learn more? Go to http://www.gray.com/  Enter Q174 in the search box to learn more about \"Postpartum: Care Instructions. \"  Current as of: October 8, 2020               Content Version: 12.8  © 3672-0689 [employer]. Care instructions adapted under license by [employer] (which disclaims liability or warranty for this information). If you have questions about a medical condition or this instruction, always ask your healthcare professional. [place] [address] any warranty or liability for your use of this information.

## 2021-04-03 NOTE — PROGRESS NOTES
Patient called out concerned about her heart rate. Patient denies any symptoms. Patient pulse through out the day has been 50-60. Explained to patient that this has been her baseline all day and she is not symptomatic. Patient reassured that I will continue to monitor her and will be in her room to assess her every two hours more if needed.

## 2021-04-03 NOTE — PROGRESS NOTES
17:57 Peripheral IV 04/03/21 Left Antecubital Placed Placement Date/Time: 04/03/21 0672   Number of Attempts: 2  Inserted By: Quinton Lizarraga RN  Size: 20 G  Orientation: Left  Location: Antecubital Arlen Kimble RN   17:59 Medication Given hydrALAZINE (APRESOLINE) 20 mg/mL injection 5 mg -  Dose: 5 mg ; Route: IntraVENous ; Scheduled Time: 1800 Arlen Kimble RN

## 2021-04-03 NOTE — PROGRESS NOTES
1400-Pt back to 464 for HTN at home. Reports buying a new BP cuff at the store. Reports BP at home was 165/100.    1407-Dr. Malik on phone. Orders to monitor vital signs X 1 hour.

## 2021-04-03 NOTE — PROGRESS NOTES
Dr. Garrett Cantrell on phone. Vital signs reviewed. Procardia 30 mg XL now. Continue to monitor BP.

## 2021-04-03 NOTE — PROGRESS NOTES
Patient calls out states that she feels like her B/P is going up checked B/P 147/86, P-56. Upon entering patient room she is looking at the vital sign monitor. Reassured patient that her B/P is in the range that it has been, patient denies any headache, blurred vision or epigastric pain. After I explained to patient what her B/P have been she then said she was worried about her heart rate , denied feeling light headed, no nausea. Instructed patient that she needs some rest and sleep that she does not need to focus on the monitor,that I will be monitoring her vital signs. Shift assessment complete, will continue to monitor.

## 2021-04-06 NOTE — DISCHARGE SUMMARY
[address], [place] [address], [address] [facility] [address]  [address] [facility] Ryan Horner MD, Tracee Ayon, [facility]  Date of Admission:  4/1/2021  7:50 PM  Date of Discharge:  4/3/2021 10:28 AM    Encounter Diagnoses   Name Primary?  Pre-eclampsia in postpartum period Yes        Patient was admitted for postpartum preeclampsia. She was given short acting Procardia and placed on magnesium for seizure prophylaxis. Blood pressure responded well and patient's headache resolved. Lab work showed thrombocytopenia initially but resolved prior to discharge. On day of discharge patient had normal lochia without headaches, visual changes, abdominal pain or nausea or vomiting. Routine postpartum and preeclampsia precautions given the patient. Patient was scheduled for short follow-up at [facility] for blood pressure check    Discharge Meds:    Discharge Medication List as of 4/3/2021  7:52 AM      START taking these medications    Details   furosemide (LASIX) 20 mg tablet One PO daily, Normal, Disp-4 Tab, R-0         CONTINUE these medications which have NOT CHANGED    Details   ibuprofen (MOTRIN) 800 mg tablet Take 1 Tab by mouth every eight (8) hours as needed for Pain., Normal, Disp-60 Tab, R-0      prenatal vit-iron fumarate-fa 27 mg iron- 0.8 mg tab tablet Take 1 Tab by mouth daily. , Historical Med      Cetirizine (ZyrTEC) 10 mg cap Take  by mouth., Historical Med      ascorbic acid, vitamin C, (Vitamin C) 250 mg tablet Take 500 mg by mouth., Historical Med             Angella Logan MD  8:17 AM  04/06/21

## 2021-04-16 PROBLEM — O16.9 HTN COMPLICATING PERIPREGNANCY, ANTEPARTUM: Status: ACTIVE | Noted: 2021-04-16

## 2021-04-21 NOTE — PROGRESS NOTES
Pt returned to triage for concern of BP still elevated  BP and meds as per RN note  Pt D/C's home with precautions and short follow up for BP check    Misa Harrell MD  12:03 PM  04/21/21

## 2021-09-23 PROBLEM — N94.12 DEEP DYSPAREUNIA: Status: ACTIVE | Noted: 2021-09-23

## 2022-03-18 PROBLEM — O16.9 HTN COMPLICATING PERIPREGNANCY, ANTEPARTUM: Status: ACTIVE | Noted: 2021-04-16

## 2022-03-20 PROBLEM — N94.12 DEEP DYSPAREUNIA: Status: ACTIVE | Noted: 2021-09-23

## 2023-10-08 VITALS — WEIGHT: 178.57 LBS | BODY MASS INDEX: 32.66 KG/M2

## 2023-10-08 DIAGNOSIS — O99.213 OBESITY AFFECTING PREGNANCY IN THIRD TRIMESTER, UNSPECIFIED OBESITY TYPE: ICD-10-CM

## 2023-10-08 PROBLEM — O16.3 HYPERTENSION AFFECTING PREGNANCY IN THIRD TRIMESTER: Status: ACTIVE | Noted: 2021-04-16

## 2023-10-09 ENCOUNTER — FOLLOWUP TELEPHONE ENCOUNTER (OUTPATIENT)
Dept: DIABETES SERVICES | Age: 28
End: 2023-10-09

## 2023-10-10 PROBLEM — O24.410 DIET CONTROLLED GESTATIONAL DIABETES MELLITUS (GDM) IN THIRD TRIMESTER: Status: ACTIVE | Noted: 2023-10-10

## 2023-10-11 PROBLEM — N94.12 DEEP DYSPAREUNIA: Status: RESOLVED | Noted: 2021-09-23 | Resolved: 2023-10-11

## 2023-10-16 ENCOUNTER — ROUTINE PRENATAL (OUTPATIENT)
Dept: OBGYN CLINIC | Age: 28
End: 2023-10-16
Payer: COMMERCIAL

## 2023-10-16 ENCOUNTER — OFFICE VISIT (OUTPATIENT)
Dept: OBGYN CLINIC | Age: 28
End: 2023-10-16
Payer: COMMERCIAL

## 2023-10-16 VITALS — SYSTOLIC BLOOD PRESSURE: 110 MMHG | DIASTOLIC BLOOD PRESSURE: 70 MMHG

## 2023-10-16 DIAGNOSIS — O09.43 HIGH RISK MULTIGRAVIDA IN THIRD TRIMESTER: ICD-10-CM

## 2023-10-16 DIAGNOSIS — O26.893 PREGNANCY HEADACHE IN THIRD TRIMESTER: ICD-10-CM

## 2023-10-16 DIAGNOSIS — O24.410 DIET CONTROLLED GESTATIONAL DIABETES MELLITUS (GDM) IN THIRD TRIMESTER: ICD-10-CM

## 2023-10-16 DIAGNOSIS — D69.6 THROMBOCYTOPENIA COMPLICATING PREGNANCY (HCC): ICD-10-CM

## 2023-10-16 DIAGNOSIS — Z71.89 COUNSELING AND COORDINATION OF CARE: ICD-10-CM

## 2023-10-16 DIAGNOSIS — Z3A.31 31 WEEKS GESTATION OF PREGNANCY: ICD-10-CM

## 2023-10-16 DIAGNOSIS — Z87.59 HISTORY OF PRE-ECLAMPSIA: ICD-10-CM

## 2023-10-16 DIAGNOSIS — Z13.32 ENCOUNTER FOR SCREENING FOR MATERNAL DEPRESSION: ICD-10-CM

## 2023-10-16 DIAGNOSIS — O99.119 THROMBOCYTOPENIA COMPLICATING PREGNANCY (HCC): ICD-10-CM

## 2023-10-16 DIAGNOSIS — O99.213 OBESITY AFFECTING PREGNANCY IN THIRD TRIMESTER, UNSPECIFIED OBESITY TYPE: ICD-10-CM

## 2023-10-16 DIAGNOSIS — R51.9 PREGNANCY HEADACHE IN THIRD TRIMESTER: ICD-10-CM

## 2023-10-16 PROBLEM — O16.3 HYPERTENSION AFFECTING PREGNANCY IN THIRD TRIMESTER: Status: RESOLVED | Noted: 2021-04-16 | Resolved: 2023-10-16

## 2023-10-16 PROCEDURE — 76811 OB US DETAILED SNGL FETUS: CPT | Performed by: OBSTETRICS & GYNECOLOGY

## 2023-10-16 PROCEDURE — 98960 EDU&TRN PT SELF-MGMT NQHP 1: CPT | Performed by: OBSTETRICS & GYNECOLOGY

## 2023-10-16 PROCEDURE — 96127 BRIEF EMOTIONAL/BEHAV ASSMT: CPT | Performed by: OBSTETRICS & GYNECOLOGY

## 2023-10-16 PROCEDURE — 93325 DOPPLER ECHO COLOR FLOW MAPG: CPT | Performed by: OBSTETRICS & GYNECOLOGY

## 2023-10-16 PROCEDURE — 76819 FETAL BIOPHYS PROFIL W/O NST: CPT | Performed by: OBSTETRICS & GYNECOLOGY

## 2023-10-16 PROCEDURE — 76825 ECHO EXAM OF FETAL HEART: CPT | Performed by: OBSTETRICS & GYNECOLOGY

## 2023-10-16 PROCEDURE — 99205 OFFICE O/P NEW HI 60 MIN: CPT | Performed by: OBSTETRICS & GYNECOLOGY

## 2023-10-16 RX ORDER — ACETAMINOPHEN 500 MG
TABLET ORAL EVERY OTHER DAY
COMMUNITY

## 2023-10-16 RX ORDER — BLOOD SUGAR DIAGNOSTIC
STRIP MISCELLANEOUS
COMMUNITY
Start: 2023-10-10

## 2023-10-16 RX ORDER — BLOOD-GLUCOSE METER
KIT MISCELLANEOUS
COMMUNITY
Start: 2023-10-10

## 2023-10-16 RX ORDER — FLUTICASONE PROPIONATE 50 MCG
1 SPRAY, SUSPENSION (ML) NASAL 2 TIMES DAILY
COMMUNITY
Start: 2022-12-08

## 2023-10-16 ASSESSMENT — PATIENT HEALTH QUESTIONNAIRE - PHQ9
SUM OF ALL RESPONSES TO PHQ QUESTIONS 1-9: 1
SUM OF ALL RESPONSES TO PHQ QUESTIONS 1-9: 1
SUM OF ALL RESPONSES TO PHQ9 QUESTIONS 1 & 2: 1
2. FEELING DOWN, DEPRESSED OR HOPELESS: 1
1. LITTLE INTEREST OR PLEASURE IN DOING THINGS: 0
SUM OF ALL RESPONSES TO PHQ QUESTIONS 1-9: 1
SUM OF ALL RESPONSES TO PHQ QUESTIONS 1-9: 1

## 2023-10-16 NOTE — PROGRESS NOTES
[address] [place] [address] [address]    6 [facility], [address] [place] [address]  P- [phone]  [phone]         MFM Consultation    Presents for evaluation of the following chief complaint(s):   Chief Complaint   Patient presents with    High Risk Pregnancy     GDM, Thrombocytopenia, BMI>30, H/O PP PreE         Tricia Mckenzie (1995) is a 33 y.o. [id number] at [address] [facility] [address] with 12/15/2023, by Other Basis. Patient is not working outside of home and is [facility]ENT[facility] of 2 boys, Zach (8yrs) and Mark Yu (2yrs). Her spouse, [address] [facility], and son Mark Yu were present today. Pt is scheduled to see Primary OB (HCW) on 10/23/23. Has appointment with [facility] on 11/2/2023 for thrombocytopenia. GDM:  Patient with recent diagnosis of gestational diabetes. Full details of current status in A/P section of problem list. Patient is scheduled to attend diet teaching with [facility] outpatient nutritional services on 10/18/2023. No recent HA's. Denies Edema. Denies Pre-eclamptic symptoms. No bleeding or LOF. Denies cramping. Has occasional McLean Michel contractions that varies in severity. No vaginal pressure recently. Reports good fetal movement. Occasional nausea. Reviewed thrombocytopenia history. Has never needed steroids nor IVIG. Dennys mid 70s in first pregnancy. No issues in 2nd pregnancy. Had preE postpartum after 2nd son was born. No other HTN issues. Likely gestational thrombocytopenia; pt and spouse are aware that if she requires steroids, it will make glucoses harder to control. Pt reports that mood is related to increased fatigue. Mood evaluated today based on discussion with pt and PHQ screen. 10/16/2023     3:41 PM   PHQ-9    Little interest or pleasure in doing things 0   Feeling down, depressed, or hopeless 1   PHQ-2 Score 1   PHQ-9 Total Score 1      Personal and family history reviewed and updated as indicated.      Addressed normal pregnancy complaints, reassured and offered suggestions for care  Reviewed

## 2023-10-16 NOTE — PATIENT INSTRUCTIONS
Please remember to send your glucose log weekly to [facility] either through [facility] or to [email or web address]. It helps us better manage your diabetes and overall care for you and your baby! Postpartum Care for Diabetics    Be sure you have a postpartum appointment with either your previously established endocrinologist or primary care provider scheduled at least 3-6 months after you deliver to continue your care. If you do not have a primary care provider, please visit [email or web address] for a list of providers within [facility]. It is important for you to continue your diabetic care and get your A1c drawn annually. Headache and Migraines in Pregnancy  Consideration of these OTC options (tylenol, caffeine, hydration, benadryl, mag oxide up to 800mg BID, riboflavin 400mg daily; frances-relief, excedrin migraine, allergy medications). Other non-pharm solutions - BeKool-type head patches, essential oils, dark room, warm compresses, mouthguard if jaw clenching/teeth grinding. If fails all OTC, then reglan/benadryl combo or imitrex/triptans recommended. If continued pain, recommend referral to neurology for additional recommendations. Opioid, including fiorcet, may be considered if fails all non-opiate medications. But these are not recommended for use due to rebound headaches and  withdrawal concerns.    Ergotamines are not recommended in pregnancy

## 2023-10-17 NOTE — ASSESSMENT & PLAN NOTE
BG Log Review  BG log reviewed from the past few weeks and there are some mild PP and FBG elevations. Pt wasn't clear on what target goal she needed to follow for meals since she reported her OB stated a range of 120-140. Reviewed with pt that her PP's needed to be less than 120. Pt goes approximately 10-12 hours fasting and may explain the mild FBG elevations. Suggested bedtime snack. Recommend diet control for now. Glucose Testing Supplies Needed  Pt is to test at minimum 4 times per day, fasting, and post-meals. According to the most recent ADA Management of Diabetes in Pregnancy: Standards of Medical Care in Diabetes--, it states testing of \". ..fasting and postprandial monitoring of blood glucose is recommended to achieve metabolic control in pregnant women with diabetes. Preprandial testing is also recommended when using insulin pumps or basal-bolus therapy so that premeal rapid-acting insulin dosage can be adjusted. \"    Since GDM is characterized by increased risk of large-for-gestational-age birth weight and  and pregnancy complications and an increased risk of long-term maternal type 2 diabetes and offspring abnormal glucose metabolism in childhood; the rigorous SMBG in the absence of insulin therapy in needed to help capture the population of patients that need insulin treatment in pregnancy and it can be initiated. This rigorous oversight of SMBG testing captures patients that need insulin treatment and it can be initiated sooner and improved maternal/fetal well-being. Pt has been advised to to send their readings to us on a weekly basis or sooner for evaluation and treatment. Dietary review   Discussed with pt the importance of using a meal plan. Reviewed the role of carbohydrates with diabetes in pregnancy.  Discussed how insulin resistance works and the role of insulin with glucose uptake into the body's cells and how the build up of glucose in the blood can makes it way across

## 2023-10-17 NOTE — PROGRESS NOTES
[facility] Diabetic Consultation    Melissa Michel (: 1995) is a 33 y.o. [id number] at 31w4d with 12/15/2023, by Other Basis. Presents for evaluation of the following chief complaint(s):  Advice Only (GDM)       Tricia Perea was referred here today from primary OB for diabetic management in pregnancy. Pt's  Dereje Kessler and youngest child, Yao, were present for consult today. Pt is diagnosed as Gestational Diabetic. Patient is scheduled to attend diet teaching with [facility] outpatient nutritional services on 10/18/2023. Stressed to pt to attend this class to further aid in her understanding of GDM diet. Explained the complications of  pulmonary immaturity, stillbirth, shoulder dystocia and fetal hypoglycemia, accelerated fetal growth and polyhydramnios associated with lack of blood glucose compliance. Discussed the long-term impact of diabetes on maternal and child health including obesity as child/adult and metabolic syndrome. Risks mitigated by appropriate fetal growth, as well as appropriate dietary and lifestyle choices and breastfeeding for at least 6 months. Pt's A1C at intake for pregnancy was     5.5% on 23    Education outcomes discussed today with patient include:    Tight glycemic management in pregnancy:    glycemic control of FBG's < 95 and PP's < 120  Testing blood sugars daily by QID finger stick blood sugars. Sending in blood sugars weekly for evaluation. For CGM users  in time range of 70% or greater. SD of 40 mg/dL or less. Dietary management in pregnancy:    Limit daily carbs to 50 to 150 grams. Reviewed carb counting and plate methods as means for meal management. Activity management in pregnancy:    3 10-15 minute walks per day, preferably after meals. ASSESSMENT/PLAN:    OUTCOMES MEASUREMENTS:  AC or EFW less than 90% at last [facility] scan prior to delivery. NATHAN less than 25 cm or DVP less than 8 cm at last [facility] scan prior to delivery.     1. Diet

## 2023-10-18 ENCOUNTER — FOLLOWUP TELEPHONE ENCOUNTER (OUTPATIENT)
Dept: DIABETES SERVICES | Age: 28
End: 2023-10-18

## 2023-11-01 DIAGNOSIS — D69.6 THROMBOCYTOPENIA COMPLICATING PREGNANCY (HCC): Primary | ICD-10-CM

## 2023-11-01 DIAGNOSIS — Z13.0 SCREENING, IRON DEFICIENCY ANEMIA: ICD-10-CM

## 2023-11-01 DIAGNOSIS — O99.119 THROMBOCYTOPENIA COMPLICATING PREGNANCY (HCC): Primary | ICD-10-CM

## 2023-11-01 NOTE — PROGRESS NOTES
NEW PATIENT INTAKE    Referral Diagnosis: Thrombocytopenia    Referring Provider: Carie Valencia MD    Primary Care Provider: Lakisha Cary MD    Family History of Cancer/ Hematology Disorders: Family history significant for maternal aunt with breast cancer. Presenting Symptoms: Thrombocytopenia    Chronological History of Pertinent Events:     67-year-old black female    PMH: GENARO, renal disorder,     23 - Met with Gynecologist - Johnny Forrest  [id number] at [address]  c/o nausea; denies vomiting; c/o fatigue  Denies bleeding or pain  c/o irregular periods  Platelets at 101  Discussed plan: patient requesting bilateral salpingectomy vs tubal after delivery. 23 - Met with Gynecologist - [facility]  OB Workup; 12 wks 5 days  Platelet precautions discussed. Hematology and MFM referrals placed. 7/3/23 - Met with Gynecologist - [facility]  OB Workup: 20 wks 3 days   Platelets at 833     - Met with Gynecologist - [facility]  OB Workup: 28 wks 3 days  Here for routine OV  Patient has a history of thrombocytopenia during her previous pregnancies (8/1/15 and 3/1/21 FT births)  Platelets at 80    A referral has been placed with [facility] for a Medical Hematology consultation and treatment.       ([facility])      7/3/23 - [facility]      23 - CBC - [facility]          Notes from Referring Provider: N/A    Presented at Tumor Board: No    Other Pertinent Information: N/A

## 2023-11-02 ENCOUNTER — HOSPITAL ENCOUNTER (OUTPATIENT)
Dept: LAB | Age: 28
Discharge: HOME | End: 2023-11-02
Payer: COMMERCIAL

## 2023-11-02 ENCOUNTER — OFFICE VISIT (OUTPATIENT)
Dept: ONCOLOGY | Age: 28
End: 2023-11-02
Payer: COMMERCIAL

## 2023-11-02 VITALS
SYSTOLIC BLOOD PRESSURE: 123 MMHG | RESPIRATION RATE: 16 BRPM | HEIGHT: 62 IN | OXYGEN SATURATION: 99 % | BODY MASS INDEX: 33.62 KG/M2 | HEART RATE: 74 BPM | WEIGHT: 182.7 LBS | DIASTOLIC BLOOD PRESSURE: 82 MMHG | TEMPERATURE: 98.5 F

## 2023-11-02 DIAGNOSIS — D69.6 THROMBOCYTOPENIA COMPLICATING PREGNANCY (HCC): ICD-10-CM

## 2023-11-02 DIAGNOSIS — O99.119 THROMBOCYTOPENIA COMPLICATING PREGNANCY (HCC): Primary | ICD-10-CM

## 2023-11-02 DIAGNOSIS — Z13.0 SCREENING, IRON DEFICIENCY ANEMIA: ICD-10-CM

## 2023-11-02 DIAGNOSIS — D69.6 THROMBOCYTOPENIA COMPLICATING PREGNANCY (HCC): Primary | ICD-10-CM

## 2023-11-02 DIAGNOSIS — O99.119 THROMBOCYTOPENIA COMPLICATING PREGNANCY (HCC): ICD-10-CM

## 2023-11-02 LAB
ALBUMIN SERPL-MCNC: 2.7 G/DL (ref 3.5–5)
ALBUMIN/GLOB SERPL: 0.7 (ref 0.4–1.6)
ALP SERPL-CCNC: 194 U/L (ref 50–136)
ALT SERPL-CCNC: 10 U/L (ref 12–65)
ANION GAP SERPL CALC-SCNC: 6 MMOL/L (ref 2–11)
AST SERPL-CCNC: 16 U/L (ref 15–37)
BASOPHILS # BLD: 0 K/UL (ref 0–0.2)
BASOPHILS NFR BLD: 0 % (ref 0–2)
BILIRUB SERPL-MCNC: 0.2 MG/DL (ref 0.2–1.1)
BUN SERPL-MCNC: 7 MG/DL (ref 6–23)
CALCIUM SERPL-MCNC: 9 MG/DL (ref 8.3–10.4)
CHLORIDE SERPL-SCNC: 110 MMOL/L (ref 101–110)
CO2 SERPL-SCNC: 21 MMOL/L (ref 21–32)
CREAT SERPL-MCNC: 0.7 MG/DL (ref 0.6–1)
DAT POLY-SP REAG RBC QL: NORMAL
DIFFERENTIAL METHOD BLD: ABNORMAL
EOSINOPHIL # BLD: 0.1 K/UL (ref 0–0.8)
EOSINOPHIL NFR BLD: 1 % (ref 0.5–7.8)
ERYTHROCYTE [DISTWIDTH] IN BLOOD BY AUTOMATED COUNT: 14.3 % (ref 11.9–14.6)
FERRITIN SERPL-MCNC: 27 NG/ML (ref 8–388)
GLOBULIN SER CALC-MCNC: 3.8 G/DL (ref 2.8–4.5)
GLUCOSE SERPL-MCNC: 100 MG/DL (ref 65–100)
HCT VFR BLD AUTO: 37.4 % (ref 35.8–46.3)
HGB BLD-MCNC: 11.8 G/DL (ref 11.7–15.4)
HGB RETIC QN AUTO: 29 PG (ref 29–35)
IMM GRANULOCYTES # BLD AUTO: 0.1 K/UL (ref 0–0.5)
IMM GRANULOCYTES NFR BLD AUTO: 1 % (ref 0–5)
IMM RETICS NFR: 23.3 % (ref 3–15.9)
IRON SATN MFR SERPL: 28 %
IRON SERPL-MCNC: 129 UG/DL (ref 35–150)
LDH SERPL L TO P-CCNC: 164 U/L (ref 100–190)
LYMPHOCYTES # BLD: 1.9 K/UL (ref 0.5–4.6)
LYMPHOCYTES NFR BLD: 27 % (ref 13–44)
MCH RBC QN AUTO: 24.1 PG (ref 26.1–32.9)
MCHC RBC AUTO-ENTMCNC: 31.6 G/DL (ref 31.4–35)
MCV RBC AUTO: 76.3 FL (ref 82–102)
MONOCYTES # BLD: 0.6 K/UL (ref 0.1–1.3)
MONOCYTES NFR BLD: 8 % (ref 4–12)
NEUTS SEG # BLD: 4.5 K/UL (ref 1.7–8.2)
NEUTS SEG NFR BLD: 63 % (ref 43–78)
NRBC # BLD: 0 K/UL (ref 0–0.2)
PLATELET # BLD AUTO: 85 K/UL (ref 150–450)
PLATELET COMMENT: ABNORMAL
PMV BLD AUTO: ABNORMAL FL (ref 9.4–12.3)
POTASSIUM SERPL-SCNC: 3.9 MMOL/L (ref 3.5–5.1)
PROT SERPL-MCNC: 6.5 G/DL (ref 6.3–8.2)
RBC # BLD AUTO: 4.9 M/UL (ref 4.05–5.2)
RBC MORPH BLD: ABNORMAL
RETICS # AUTO: 0.13 M/UL (ref 0.03–0.1)
RETICS/RBC NFR AUTO: 2.8 % (ref 0.3–2)
SODIUM SERPL-SCNC: 137 MMOL/L (ref 133–143)
TIBC SERPL-MCNC: 464 UG/DL (ref 250–450)
URATE SERPL-MCNC: 4.5 MG/DL (ref 2.6–6)
WBC # BLD AUTO: 7.2 K/UL (ref 4.3–11.1)
WBC MORPH BLD: ABNORMAL

## 2023-11-02 PROCEDURE — 84550 ASSAY OF BLOOD/URIC ACID: CPT

## 2023-11-02 PROCEDURE — 83550 IRON BINDING TEST: CPT

## 2023-11-02 PROCEDURE — 82784 ASSAY IGA/IGD/IGG/IGM EACH: CPT

## 2023-11-02 PROCEDURE — 85046 RETICYTE/HGB CONCENTRATE: CPT

## 2023-11-02 PROCEDURE — 83540 ASSAY OF IRON: CPT

## 2023-11-02 PROCEDURE — 86022 PLATELET ANTIBODIES: CPT

## 2023-11-02 PROCEDURE — 82785 ASSAY OF IGE: CPT

## 2023-11-02 PROCEDURE — 87389 HIV-1 AG W/HIV-1&-2 AB AG IA: CPT

## 2023-11-02 PROCEDURE — 80053 COMPREHEN METABOLIC PANEL: CPT

## 2023-11-02 PROCEDURE — 99205 OFFICE O/P NEW HI 60 MIN: CPT | Performed by: PEDIATRICS

## 2023-11-02 PROCEDURE — 36415 COLL VENOUS BLD VENIPUNCTURE: CPT

## 2023-11-02 PROCEDURE — 80074 ACUTE HEPATITIS PANEL: CPT

## 2023-11-02 PROCEDURE — 82728 ASSAY OF FERRITIN: CPT

## 2023-11-02 PROCEDURE — 86880 COOMBS TEST DIRECT: CPT

## 2023-11-02 PROCEDURE — 85025 COMPLETE CBC W/AUTO DIFF WBC: CPT

## 2023-11-02 PROCEDURE — 83615 LACTATE (LD) (LDH) ENZYME: CPT

## 2023-11-02 ASSESSMENT — PATIENT HEALTH QUESTIONNAIRE - PHQ9: DEPRESSION UNABLE TO ASSESS: PT REFUSES

## 2023-11-02 NOTE — PROGRESS NOTES
HISTORY OF PRESENT ILLNESS  Ashlie Brown is a 33 y.o. y.o. female with thrombocytopenia      ABSTRACT  Referral Diagnosis: Thrombocytopenia     Referring Provider: Brittney Camargo MD     Primary Care Provider: Cheo Cruz MD     Family History of Cancer/ Hematology Disorders: Family history significant for maternal aunt with breast cancer. Presenting Symptoms: Thrombocytopenia     Chronological History of Pertinent Events:      59-year-old black female     PMH: GENARO, renal disorder,      23 - Met with Gynecologist - Veronica Chi  [id number] at [address]  c/o nausea; denies vomiting; c/o fatigue  Denies bleeding or pain  c/o irregular periods  Platelets at 358  Discussed plan: patient requesting bilateral salpingectomy vs tubal after delivery. 23 - Met with Gynecologist - [facility]  OB Workup; 12 wks 5 days  Platelet precautions discussed. Hematology and MFM referrals placed. 7/3/23 - Met with Gynecologist - [facility]  OB Workup: 20 wks 3 days   Platelets at 141     23 - Met with Gynecologist - [facility]  OB Workup: 28 wks 3 days  Here for routine OV  Patient has a history of thrombocytopenia during her previous pregnancies (8/1/15 and 3/1/21 FT births)  Platelets at 80     A referral has been placed with [facility] for a Medical Hematology consultation and treatment. ([facility])       7/3/23 - [facility]       23 - CBC - [facility]            Notes from Referring Provider: N/A  HPI: 29 w with h/o ITP  Plts normal outside of preg  No blood in past  No iron in past  No active bleeding  Gest diabetes      Patient Denies:   Fevers   Night Sweats   Chills   Weight Loss   Bone Pain   Lymphadenopathy  Patient Denies:  Nose bleeds  Gum bleeds  Bruising or petechia  Bleeding with surgery  Bleeding with accidents  Transfusions  History or free bleeding or hemophilia    Current Outpatient Medications   Medication Sig    ONETOUCH VERIO strip USE AS DIRECTED.  TO CHECK BLOOD SUGARS

## 2023-11-02 NOTE — PATIENT INSTRUCTIONS
Patient Instructions from Today's Visit    Reason for Visit:  New patient visit for low platelets in pregnancy (34 weeks)     Plan: Your hemoglobin and iron stores are normal.    Your platelets are low (85)  No intervention now. We will recheck your platelets in 2 weeks. If they go up to around a 100. We can avoid intervention. If they drop further we will recommend steroids. We will see you 2 weeks after that (in 4 weeks) and recheck that too. IVIG is also an option in the future. Follow Up:  2 week lab check (CBC only)  4 weeks labs and visit    Recent Lab Results:      Treatment Summary has been discussed and given to patient: NA        -------------------------------------------------------------------------------------------------------------------  Please call our office at [phone] if you have any  of the following symptoms:   Fever of 100.5 or greater  Chills  Shortness of breath  Swelling or pain in one leg    After office hours an answering service is available and will contact a provider for emergencies or if you are experiencing any of the above symptoms. Patient has My Chart. My Chart log in information explained on the after visit summary printout at the [address] [email or web address] desk.

## 2023-11-04 LAB
GLYCOPROTEIN IV AB: NEGATIVE
HLA AB SER QL IA: POSITIVE
IGA SERPL-MCNC: 103 MG/DL (ref 87–352)
IGG SERPL-MCNC: 778 MG/DL (ref 586–1602)
IGM SERPL-MCNC: 83 MG/DL (ref 26–217)
PLAT GP IA/IIA AB SER QL IA: NEGATIVE
PLAT GP IB/IX AB SER QL IA: NEGATIVE
PLAT GP IIB/IIIA AB SER QL IA: NEGATIVE

## 2023-11-05 LAB
HAV IGM SER QL: NONREACTIVE
HBV CORE IGM SER QL: NONREACTIVE
HBV SURFACE AG SER QL: NONREACTIVE
HCV AB SER QL: NONREACTIVE
HIV 1+2 AB+HIV1 P24 AG SERPL QL IA: NONREACTIVE
HIV 1/2 RESULT COMMENT: NORMAL

## 2023-11-06 ENCOUNTER — ROUTINE PRENATAL (OUTPATIENT)
Dept: OBGYN CLINIC | Age: 28
End: 2023-11-06
Payer: COMMERCIAL

## 2023-11-06 ENCOUNTER — OFFICE VISIT (OUTPATIENT)
Dept: OBGYN CLINIC | Age: 28
End: 2023-11-06

## 2023-11-06 VITALS — SYSTOLIC BLOOD PRESSURE: 122 MMHG | DIASTOLIC BLOOD PRESSURE: 70 MMHG

## 2023-11-06 DIAGNOSIS — D69.6 THROMBOCYTOPENIA COMPLICATING PREGNANCY (HCC): ICD-10-CM

## 2023-11-06 DIAGNOSIS — Z3A.34 34 WEEKS GESTATION OF PREGNANCY: ICD-10-CM

## 2023-11-06 DIAGNOSIS — O99.119 THROMBOCYTOPENIA COMPLICATING PREGNANCY (HCC): ICD-10-CM

## 2023-11-06 DIAGNOSIS — O24.410 DIET CONTROLLED GESTATIONAL DIABETES MELLITUS (GDM) IN THIRD TRIMESTER: ICD-10-CM

## 2023-11-06 DIAGNOSIS — O99.213 OBESITY AFFECTING PREGNANCY IN THIRD TRIMESTER, UNSPECIFIED OBESITY TYPE: ICD-10-CM

## 2023-11-06 DIAGNOSIS — O09.43 HIGH RISK MULTIGRAVIDA IN THIRD TRIMESTER: ICD-10-CM

## 2023-11-06 DIAGNOSIS — Z13.32 ENCOUNTER FOR SCREENING FOR MATERNAL DEPRESSION: ICD-10-CM

## 2023-11-06 DIAGNOSIS — Z87.59 HISTORY OF PRE-ECLAMPSIA: ICD-10-CM

## 2023-11-06 LAB — IGE SERPL-ACNC: 65 IU/ML (ref 6–495)

## 2023-11-06 PROCEDURE — 99214 OFFICE O/P EST MOD 30 MIN: CPT | Performed by: OBSTETRICS & GYNECOLOGY

## 2023-11-06 PROCEDURE — 76816 OB US FOLLOW-UP PER FETUS: CPT | Performed by: OBSTETRICS & GYNECOLOGY

## 2023-11-06 PROCEDURE — 96127 BRIEF EMOTIONAL/BEHAV ASSMT: CPT | Performed by: OBSTETRICS & GYNECOLOGY

## 2023-11-06 PROCEDURE — 76819 FETAL BIOPHYS PROFIL W/O NST: CPT | Performed by: OBSTETRICS & GYNECOLOGY

## 2023-11-06 RX ORDER — CEPHALEXIN 500 MG/1
CAPSULE ORAL
COMMUNITY
Start: 2023-10-27

## 2023-11-06 ASSESSMENT — PATIENT HEALTH QUESTIONNAIRE - PHQ9
1. LITTLE INTEREST OR PLEASURE IN DOING THINGS: 0
SUM OF ALL RESPONSES TO PHQ QUESTIONS 1-9: 0
SUM OF ALL RESPONSES TO PHQ QUESTIONS 1-9: 0
2. FEELING DOWN, DEPRESSED OR HOPELESS: 0
SUM OF ALL RESPONSES TO PHQ9 QUESTIONS 1 & 2: 0
SUM OF ALL RESPONSES TO PHQ QUESTIONS 1-9: 0
SUM OF ALL RESPONSES TO PHQ QUESTIONS 1-9: 0

## 2023-11-06 NOTE — PROGRESS NOTES
[address] [place] [address] [address]    6 [facility], [address] [place] [address] Frank  [phone]       [facility] Follow-up Visit  Eric Palacios (1995) is a 33 y.o. [id number] at 34w3d with 12/15/2023, by Other Basis. Presents for evaluation of the following chief complaint(s):   Chief Complaint   Patient presents with    High Risk Pregnancy     GDM, Thrombocytopenia, BMI>30, H/O PP PreE           Patient is not working outside of home and is [facility]ULT [facility]ENT[facility] of 2 boys, Zach (8yrs) and Zahida Coughlin (2yrs). Pt is scheduled to see Primary OB (HCW) on 11/13/23. Has appointment with [facility] on 11/30/2023 for thrombocytopenia. GDM: See A&P overviews. Doing well overall. Will check BID at this time. No recent HA's. Denies Edema. Denies Pre-eclamptic symptoms. No bleeding or LOF. Denies cramping. Has occasional Live Michel contractions weekly. No vaginal pressure recently. Reports good fetal movement. Mood evaluated today based on discussion with pt and PHQ screen. 11/6/2023    11:52 AM   PHQ-9    Little interest or pleasure in doing things 0   Feeling down, depressed, or hopeless 0   PHQ-2 Score 0   PHQ-9 Total Score 0      Mood Reassuring today    Interval history since prior appt reviewed and updated as indicated. Reviewed plan of care set out by H/O. Questions and concerns addressed. Addressed normal pregnancy complaints, reassured and offered suggestions for care  Reviewed gestational age precautions and activity goals/limitations  Nutritional counseling as well as specific goals based on current maternal and fetal status  Options for GERD, constipation, other common complaints reviewed. Reviewed gestational age appropriate preventive care regarding communicable disease transmission and vaccines as appropriate (including flu, TDaP >28wk, RSV 32-36wk, and COVID.)  Mood counseling today    Exam:     Vitals:    11/06/23 1135   BP: 731/66      Applicable labs reviewed.    Please see formal

## 2023-11-07 NOTE — PATIENT INSTRUCTIONS
Resources for Depression/Anxiety  Postpartum Support International (PSI). PSI Warmline:  1-377-443-4PPD (1560). WWW. POSTPARTUM. NET    Mom's IMPACTT  [email or web address]      Preeclampsia Precautions  Contact your OB office or go to [address] [place] [address] 4th floor BRYANNA if:    develop high blood pressure (>140/>90)  headache that does not improve with tylenol/rest/hydration  pain in area of your liver (under right breast) that does not resolve with position change  vision changes  Seizures (go straight to hospital emergently)  Baby is not moving well

## 2023-11-10 NOTE — PROGRESS NOTES
maintain activity at least 3x per week and have A1c drawn annually. Goals Addressed                   This Visit's Progress     Skills necessary to properly manage their diabetes, including use of devices and symptom monitoring tools. On track     Understands and monitors blood sugar levels   On track             15 minutes spent on review of diabetic education by [facility]. I have reviewed the patient's diabetic assessment/plan by MALINDA Sunshine. I agree with treatment/plan as above. Merrick Burt MD    It is the standard of care that patients with diabetes in pregnancy have glycemic control monitored weekly. It helps us better manage your gestational diabetes and overall care for you and your baby! This may be done in person with the diabetic educator/physician or may occur virtually via email/Innozhart. Please remember to send your glucose log weekly to [facility] either through [facility] & [email or web address] may receive a bill from [facility] for this service. An electronic signature was used to authenticate this note.   MALINDA Sunshine Ed, [facility]    Patient Active Problem List   Diagnosis Code    History of pre-eclampsia Z87.59    Thrombocytopenia complicating pregnancy ([address]) O99.119, D69.6    Obesity affecting pregnancy in third trimester O99.213    Diet controlled gestational diabetes mellitus (GDM) in third trimester O22.18    High risk multigravida in third trimester O09.43

## 2023-11-10 NOTE — ASSESSMENT & PLAN NOTE
BG Log Review  BG log reviewed from 10/16 to 11/6, BGs consistently in an acceptable range, recommend continue diet at this time. Concerns addressed  Discussed with pt that she may see rise in BG readings if steroids are needed for her thrombocytopenia. Medication management  None at this time. Postpartum Care for Diabetics  Discussed with pt regarding long term risks that approximately 50% of women with GDM go on to develop type 2 diabetes per the [facility]S JOHNATHAN, December 2022. Stressed having a postpartum appointment with either previously established endocrinologist or primary care provider scheduled at least 3-6 months after you deliver to continue your care. Stressed reaching a healthy body weight after delivery, maintain activity at least 3x per week and have A1c drawn annually.

## 2023-11-14 ENCOUNTER — HOSPITAL ENCOUNTER (OUTPATIENT)
Age: 28
Discharge: HOME | End: 2023-11-14
Attending: OBSTETRICS & GYNECOLOGY | Admitting: OBSTETRICS & GYNECOLOGY
Payer: COMMERCIAL

## 2023-11-14 VITALS
TEMPERATURE: 98.1 F | SYSTOLIC BLOOD PRESSURE: 140 MMHG | RESPIRATION RATE: 15 BRPM | BODY MASS INDEX: 33.86 KG/M2 | DIASTOLIC BLOOD PRESSURE: 82 MMHG | HEIGHT: 62 IN | WEIGHT: 184 LBS | HEART RATE: 68 BPM

## 2023-11-14 LAB
ALBUMIN SERPL-MCNC: 2.8 G/DL (ref 3.5–5)
ALBUMIN/GLOB SERPL: 0.8 (ref 0.4–1.6)
ALP SERPL-CCNC: 230 U/L (ref 50–136)
ALT SERPL-CCNC: 14 U/L (ref 12–65)
ANION GAP SERPL CALC-SCNC: 8 MMOL/L (ref 2–11)
AST SERPL-CCNC: 19 U/L (ref 15–37)
BILIRUB SERPL-MCNC: 0.2 MG/DL (ref 0.2–1.1)
BUN SERPL-MCNC: 8 MG/DL (ref 6–23)
CALCIUM SERPL-MCNC: 9.4 MG/DL (ref 8.3–10.4)
CHLORIDE SERPL-SCNC: 110 MMOL/L (ref 101–110)
CO2 SERPL-SCNC: 21 MMOL/L (ref 21–32)
CREAT SERPL-MCNC: 0.8 MG/DL (ref 0.6–1)
CREAT UR-MCNC: 23 MG/DL
ERYTHROCYTE [DISTWIDTH] IN BLOOD BY AUTOMATED COUNT: 14.7 % (ref 11.9–14.6)
GLOBULIN SER CALC-MCNC: 3.7 G/DL (ref 2.8–4.5)
GLUCOSE SERPL-MCNC: 82 MG/DL (ref 65–100)
HCT VFR BLD AUTO: 39.9 % (ref 35.8–46.3)
HGB BLD-MCNC: 12.4 G/DL (ref 11.7–15.4)
LDH SERPL L TO P-CCNC: 191 U/L (ref 100–190)
MCH RBC QN AUTO: 24.1 PG (ref 26.1–32.9)
MCHC RBC AUTO-ENTMCNC: 31.1 G/DL (ref 31.4–35)
MCV RBC AUTO: 77.5 FL (ref 82–102)
NRBC # BLD: 0 K/UL (ref 0–0.2)
PLATELET # BLD AUTO: 70 K/UL (ref 150–450)
PMV BLD AUTO: ABNORMAL FL (ref 9.4–12.3)
POTASSIUM SERPL-SCNC: 3.9 MMOL/L (ref 3.5–5.1)
PROT SERPL-MCNC: 6.5 G/DL (ref 6.3–8.2)
PROT UR-MCNC: <5 MG/DL
PROT/CREAT UR-RTO: NORMAL
RBC # BLD AUTO: 5.15 M/UL (ref 4.05–5.2)
SODIUM SERPL-SCNC: 139 MMOL/L (ref 133–143)
URATE SERPL-MCNC: 5.2 MG/DL (ref 2.6–6)
WBC # BLD AUTO: 6.5 K/UL (ref 4.3–11.1)

## 2023-11-14 PROCEDURE — 84550 ASSAY OF BLOOD/URIC ACID: CPT

## 2023-11-14 PROCEDURE — 59025 FETAL NON-STRESS TEST: CPT

## 2023-11-14 PROCEDURE — 36415 COLL VENOUS BLD VENIPUNCTURE: CPT

## 2023-11-14 PROCEDURE — 83615 LACTATE (LD) (LDH) ENZYME: CPT

## 2023-11-14 PROCEDURE — 99285 EMERGENCY DEPT VISIT HI MDM: CPT

## 2023-11-14 PROCEDURE — 82570 ASSAY OF URINE CREATININE: CPT

## 2023-11-14 PROCEDURE — 85027 COMPLETE CBC AUTOMATED: CPT

## 2023-11-14 PROCEDURE — 84156 ASSAY OF PROTEIN URINE: CPT

## 2023-11-14 PROCEDURE — 80053 COMPREHEN METABOLIC PANEL: CPT

## 2023-11-14 RX ORDER — PREDNISONE 20 MG/1
60 TABLET ORAL DAILY
Qty: 15 TABLET | Refills: 0 | Status: SHIPPED | OUTPATIENT
Start: 2023-11-14 | End: 2023-11-19

## 2023-11-16 ENCOUNTER — TELEPHONE (OUTPATIENT)
Dept: ONCOLOGY | Age: 28
End: 2023-11-16

## 2023-11-16 ENCOUNTER — HOSPITAL ENCOUNTER (OUTPATIENT)
Dept: LAB | Age: 28
Discharge: HOME | End: 2023-11-16
Payer: COMMERCIAL

## 2023-11-16 DIAGNOSIS — O99.119 THROMBOCYTOPENIA COMPLICATING PREGNANCY (HCC): ICD-10-CM

## 2023-11-16 DIAGNOSIS — D69.6 THROMBOCYTOPENIA COMPLICATING PREGNANCY (HCC): ICD-10-CM

## 2023-11-16 LAB
BASOPHILS # BLD: 0 K/UL (ref 0–0.2)
BASOPHILS NFR BLD: 0 % (ref 0–2)
DIFFERENTIAL METHOD BLD: ABNORMAL
EOSINOPHIL # BLD: 0 K/UL (ref 0–0.8)
EOSINOPHIL NFR BLD: 0 % (ref 0.5–7.8)
ERYTHROCYTE [DISTWIDTH] IN BLOOD BY AUTOMATED COUNT: 15.2 % (ref 11.9–14.6)
HCT VFR BLD AUTO: 38.3 % (ref 35.8–46.3)
HGB BLD-MCNC: 11.9 G/DL (ref 11.7–15.4)
IMM GRANULOCYTES # BLD AUTO: 0.1 K/UL (ref 0–0.5)
IMM GRANULOCYTES NFR BLD AUTO: 1 % (ref 0–5)
LYMPHOCYTES # BLD: 1.8 K/UL (ref 0.5–4.6)
LYMPHOCYTES NFR BLD: 24 % (ref 13–44)
MCH RBC QN AUTO: 23.9 PG (ref 26.1–32.9)
MCHC RBC AUTO-ENTMCNC: 31.1 G/DL (ref 31.4–35)
MCV RBC AUTO: 77.1 FL (ref 82–102)
MONOCYTES # BLD: 0.5 K/UL (ref 0.1–1.3)
MONOCYTES NFR BLD: 7 % (ref 4–12)
NEUTS SEG # BLD: 5.2 K/UL (ref 1.7–8.2)
NEUTS SEG NFR BLD: 68 % (ref 43–78)
NRBC # BLD: 0 K/UL (ref 0–0.2)
PLATELET # BLD AUTO: 66 K/UL (ref 150–450)
PLATELET COMMENT: ABNORMAL
PMV BLD AUTO: ABNORMAL FL (ref 9.4–12.3)
RBC # BLD AUTO: 4.97 M/UL (ref 4.05–5.2)
RBC MORPH BLD: ABNORMAL
RBC MORPH BLD: ABNORMAL
WBC # BLD AUTO: 7.6 K/UL (ref 4.3–11.1)
WBC MORPH BLD: ABNORMAL

## 2023-11-16 PROCEDURE — 36415 COLL VENOUS BLD VENIPUNCTURE: CPT

## 2023-11-16 PROCEDURE — 85025 COMPLETE CBC W/AUTO DIFF WBC: CPT

## 2023-11-16 NOTE — TELEPHONE ENCOUNTER
Lab only this am  Plts 66   Pt has taken 1 dose of Prednisone yesterday am 60mg per OB and had rx for daily dosing. Pt is 35 weeks, 6 days. Follow up scheduled w Heme 11-30-23. Labs reviewed. Reviewed with Dr Paul Fay. No changes. Continue Prednisone 60mg by mouth every morning. Keep hematology follow up in 2 weeks and OB/MFM as scheduled. Call with questions/issues. Reviewed above with patient. Pt agreeable. Pt inquiring next steps if steroids do not improve her platelets. Reviewed possibility of IVIG. Pt verbalized understanding.

## 2023-11-20 ENCOUNTER — HOSPITAL ENCOUNTER (INPATIENT)
Age: 28
LOS: 4 days | Discharge: HOME | End: 2023-11-25
Attending: OBSTETRICS & GYNECOLOGY | Admitting: OBSTETRICS & GYNECOLOGY
Payer: COMMERCIAL

## 2023-11-20 DIAGNOSIS — G89.18 POST-OP PAIN: ICD-10-CM

## 2023-11-20 PROBLEM — O13.3 GESTATIONAL HYPERTENSION WITHOUT SIGNIFICANT PROTEINURIA DURING PREGNANCY IN THIRD TRIMESTER, ANTEPARTUM: Status: ACTIVE | Noted: 2023-11-20

## 2023-11-20 LAB
GLUCOSE BLD STRIP.AUTO-MCNC: 99 MG/DL (ref 65–100)
SERVICE CMNT-IMP: NORMAL

## 2023-11-20 PROCEDURE — 99221 1ST HOSP IP/OBS SF/LOW 40: CPT | Performed by: OBSTETRICS & GYNECOLOGY

## 2023-11-20 PROCEDURE — 96374 THER/PROPH/DIAG INJ IV PUSH: CPT

## 2023-11-20 PROCEDURE — 96372 THER/PROPH/DIAG INJ SC/IM: CPT

## 2023-11-20 PROCEDURE — 96365 THER/PROPH/DIAG IV INF INIT: CPT

## 2023-11-20 PROCEDURE — 6360000002 HC RX W HCPCS: Performed by: OBSTETRICS & GYNECOLOGY

## 2023-11-20 PROCEDURE — 6370000000 HC RX 637 (ALT 250 FOR IP): Performed by: OBSTETRICS & GYNECOLOGY

## 2023-11-20 PROCEDURE — G0378 HOSPITAL OBSERVATION PER HR: HCPCS

## 2023-11-20 PROCEDURE — 82962 GLUCOSE BLOOD TEST: CPT

## 2023-11-20 RX ORDER — BETAMETHASONE SODIUM PHOSPHATE AND BETAMETHASONE ACETATE 3; 3 MG/ML; MG/ML
12 INJECTION, SUSPENSION INTRA-ARTICULAR; INTRALESIONAL; INTRAMUSCULAR; SOFT TISSUE EVERY 24 HOURS
Status: COMPLETED | OUTPATIENT
Start: 2023-11-20 | End: 2023-11-21

## 2023-11-20 RX ORDER — ACETAMINOPHEN 325 MG/1
650 TABLET ORAL EVERY 6 HOURS SCHEDULED
Status: DISCONTINUED | OUTPATIENT
Start: 2023-11-20 | End: 2023-11-20

## 2023-11-20 RX ORDER — DIPHENHYDRAMINE HCL 25 MG
25 CAPSULE ORAL EVERY 6 HOURS PRN
Status: DISCONTINUED | OUTPATIENT
Start: 2023-11-20 | End: 2023-11-22

## 2023-11-20 RX ORDER — ONDANSETRON 2 MG/ML
4 INJECTION INTRAMUSCULAR; INTRAVENOUS EVERY 6 HOURS PRN
Status: DISCONTINUED | OUTPATIENT
Start: 2023-11-20 | End: 2023-11-22

## 2023-11-20 RX ORDER — OXYCODONE HYDROCHLORIDE 5 MG/1
5 TABLET ORAL ONCE AS NEEDED
Status: COMPLETED | OUTPATIENT
Start: 2023-11-20 | End: 2023-11-20

## 2023-11-20 RX ORDER — ACETAMINOPHEN 325 MG/1
650 TABLET ORAL EVERY 6 HOURS PRN
Status: DISCONTINUED | OUTPATIENT
Start: 2023-11-21 | End: 2023-11-22

## 2023-11-20 RX ORDER — METOCLOPRAMIDE 10 MG/1
10 TABLET ORAL ONCE AS NEEDED
Status: COMPLETED | OUTPATIENT
Start: 2023-11-20 | End: 2023-11-20

## 2023-11-20 RX ORDER — METOCLOPRAMIDE HYDROCHLORIDE 5 MG/ML
10 INJECTION INTRAMUSCULAR; INTRAVENOUS ONCE
Status: DISCONTINUED | OUTPATIENT
Start: 2023-11-20 | End: 2023-11-20

## 2023-11-20 RX ADMIN — DIPHENHYDRAMINE HYDROCHLORIDE 25 MG: 25 CAPSULE ORAL at 10:32

## 2023-11-20 RX ADMIN — METOCLOPRAMIDE 10 MG: 10 TABLET ORAL at 10:32

## 2023-11-20 RX ADMIN — ACETAMINOPHEN 650 MG: 325 TABLET, FILM COATED ORAL at 23:42

## 2023-11-20 RX ADMIN — IMMUNE GLOBULIN (HUMAN) 50 G: 10 INJECTION INTRAVENOUS; SUBCUTANEOUS at 18:11

## 2023-11-20 RX ADMIN — OXYCODONE HYDROCHLORIDE 5 MG: 5 TABLET ORAL at 10:32

## 2023-11-20 RX ADMIN — ACETAMINOPHEN 650 MG: 325 TABLET, FILM COATED ORAL at 17:31

## 2023-11-20 RX ADMIN — BETAMETHASONE ACETATE AND BETAMETHASONE SODIUM PHOSPHATE 12 MG: 3; 3 INJECTION, SUSPENSION INTRA-ARTICULAR; INTRALESIONAL; INTRAMUSCULAR; SOFT TISSUE at 18:02

## 2023-11-20 RX ADMIN — DIPHENHYDRAMINE HYDROCHLORIDE 25 MG: 25 CAPSULE ORAL at 17:31

## 2023-11-20 RX ADMIN — DIPHENHYDRAMINE HYDROCHLORIDE 25 MG: 25 CAPSULE ORAL at 23:42

## 2023-11-20 ASSESSMENT — PAIN DESCRIPTION - LOCATION: LOCATION: HEAD

## 2023-11-20 ASSESSMENT — PAIN DESCRIPTION - DESCRIPTORS: DESCRIPTORS: ACHING

## 2023-11-20 ASSESSMENT — PAIN SCALES - GENERAL: PAINLEVEL_OUTOF10: 2

## 2023-11-20 NOTE — CONSULTS
[address] [place] [address] [address]    8 [facility], [address] [place] [address] Frank  [phone]            Maternal Fetal Medicine H&P Note    2023     Chief Complaint:  Pregnancy and GHTN and thrombocytopenia. History of Present Illness: 33 y.o. [id number] at 36w3d gestation who presents with headache in the setting of gestational hypertension. She reports that at approximately midnight this morning she was woken from sleep by headache, and so she checked her blood pressure at home and found it to be in the 430R systolic. Then she reported to triage at [facility] for evaluation given these complaints. There she produced mild range blood pressures but none in the severe range and labs were reassuring with the exception of her platelet count which will be discussed separately. The patient has received treatment with Tylenol as well as a headache cocktail and reports that the headache has eased but is still present intermittently. The patient carries a known diagnosis of ITP. She follows with hematology and has been on prednisone 60 mg daily for 4 days. Her platelet count this morning was 68k which is stable from prior to initiating steroid course. Her last hematology visit was prior to her new diagnosis of gestational hypertension. Patient denies abdominal pain, vision changes. No regular contractions, LOF, VB. Good FM. Minimal edema and LE pain. No chest pain or shortness of breath. No significant reflux, nausea, constipation, or other GI complaints. Review of Systems:  Pertinent items are noted in HPI.      History:  OB History    Para Term  AB Living   5 2 2   2 2   SAB IAB Ectopic Molar Multiple Live Births             2      # Outcome Date GA Lbr Stoney/2nd Weight Sex Delivery Anes PTL Lv   5 Current            4 Term 21 39w2d 18:28 / 00:15 3.215 kg (7 lb 1.4 oz) M  EPI N DAVID   3 Term 08/05/15 37w4d  3.04 kg (6 lb 11.2 oz) M

## 2023-11-21 LAB
ALBUMIN SERPL-MCNC: 2.4 G/DL (ref 3.5–5)
ALBUMIN/GLOB SERPL: 0.5 (ref 0.4–1.6)
ALP SERPL-CCNC: 196 U/L (ref 50–136)
ALT SERPL-CCNC: 62 U/L (ref 12–65)
ANION GAP SERPL CALC-SCNC: 9 MMOL/L (ref 2–11)
AST SERPL-CCNC: 72 U/L (ref 15–37)
BASOPHILS # BLD: 0.1 K/UL (ref 0–0.2)
BASOPHILS NFR BLD: 1 % (ref 0–2)
BILIRUB SERPL-MCNC: 0.2 MG/DL (ref 0.2–1.1)
BUN SERPL-MCNC: 8 MG/DL (ref 6–23)
CALCIUM SERPL-MCNC: 8.9 MG/DL (ref 8.3–10.4)
CHLORIDE SERPL-SCNC: 113 MMOL/L (ref 101–110)
CO2 SERPL-SCNC: 18 MMOL/L (ref 21–32)
CREAT SERPL-MCNC: 0.74 MG/DL (ref 0.6–1)
DIFFERENTIAL METHOD BLD: ABNORMAL
EOSINOPHIL # BLD: 0 K/UL (ref 0–0.8)
EOSINOPHIL NFR BLD: 0 % (ref 0.5–7.8)
ERYTHROCYTE [DISTWIDTH] IN BLOOD BY AUTOMATED COUNT: 15.4 % (ref 11.9–14.6)
GLOBULIN SER CALC-MCNC: 4.5 G/DL (ref 2.8–4.5)
GLUCOSE BLD STRIP.AUTO-MCNC: 108 MG/DL (ref 65–100)
GLUCOSE BLD STRIP.AUTO-MCNC: 118 MG/DL (ref 65–100)
GLUCOSE BLD STRIP.AUTO-MCNC: 123 MG/DL (ref 65–100)
GLUCOSE SERPL-MCNC: 108 MG/DL (ref 65–100)
HCT VFR BLD AUTO: 38.5 % (ref 35.8–46.3)
HGB BLD-MCNC: 11.9 G/DL (ref 11.7–15.4)
IMM GRANULOCYTES # BLD AUTO: 0.1 K/UL (ref 0–0.5)
IMM GRANULOCYTES NFR BLD AUTO: 1 % (ref 0–5)
LYMPHOCYTES # BLD: 0.8 K/UL (ref 0.5–4.6)
LYMPHOCYTES NFR BLD: 13 % (ref 13–44)
MCH RBC QN AUTO: 24 PG (ref 26.1–32.9)
MCHC RBC AUTO-ENTMCNC: 30.9 G/DL (ref 31.4–35)
MCV RBC AUTO: 77.8 FL (ref 82–102)
MONOCYTES # BLD: 0.3 K/UL (ref 0.1–1.3)
MONOCYTES NFR BLD: 5 % (ref 4–12)
NEUTS SEG # BLD: 5 K/UL (ref 1.7–8.2)
NEUTS SEG NFR BLD: 80 % (ref 43–78)
NRBC # BLD: 0 K/UL (ref 0–0.2)
PLATELET # BLD AUTO: 70 K/UL (ref 150–450)
PLATELET COMMENT: SLIGHT
PMV BLD AUTO: ABNORMAL FL (ref 9.4–12.3)
POTASSIUM SERPL-SCNC: 4 MMOL/L (ref 3.5–5.1)
PROT SERPL-MCNC: 6.9 G/DL (ref 6.3–8.2)
RBC # BLD AUTO: 4.95 M/UL (ref 4.05–5.2)
RBC MORPH BLD: ABNORMAL
SERVICE CMNT-IMP: ABNORMAL
SODIUM SERPL-SCNC: 140 MMOL/L (ref 133–143)
WBC # BLD AUTO: 6.3 K/UL (ref 4.3–11.1)
WBC MORPH BLD: ABNORMAL

## 2023-11-21 PROCEDURE — 96372 THER/PROPH/DIAG INJ SC/IM: CPT

## 2023-11-21 PROCEDURE — 86923 COMPATIBILITY TEST ELECTRIC: CPT

## 2023-11-21 PROCEDURE — 86900 BLOOD TYPING SEROLOGIC ABO: CPT

## 2023-11-21 PROCEDURE — 96376 TX/PRO/DX INJ SAME DRUG ADON: CPT

## 2023-11-21 PROCEDURE — 36415 COLL VENOUS BLD VENIPUNCTURE: CPT

## 2023-11-21 PROCEDURE — 86901 BLOOD TYPING SEROLOGIC RH(D): CPT

## 2023-11-21 PROCEDURE — 85025 COMPLETE CBC W/AUTO DIFF WBC: CPT

## 2023-11-21 PROCEDURE — 80053 COMPREHEN METABOLIC PANEL: CPT

## 2023-11-21 PROCEDURE — 82962 GLUCOSE BLOOD TEST: CPT

## 2023-11-21 PROCEDURE — G0378 HOSPITAL OBSERVATION PER HR: HCPCS

## 2023-11-21 PROCEDURE — 6370000000 HC RX 637 (ALT 250 FOR IP): Performed by: OBSTETRICS & GYNECOLOGY

## 2023-11-21 PROCEDURE — 99231 SBSQ HOSP IP/OBS SF/LOW 25: CPT | Performed by: OBSTETRICS & GYNECOLOGY

## 2023-11-21 PROCEDURE — 6360000002 HC RX W HCPCS: Performed by: OBSTETRICS & GYNECOLOGY

## 2023-11-21 PROCEDURE — 30233S1 TRANSFUSION OF NONAUTOLOGOUS GLOBULIN INTO PERIPHERAL VEIN, PERCUTANEOUS APPROACH: ICD-10-PCS | Performed by: OBSTETRICS & GYNECOLOGY

## 2023-11-21 PROCEDURE — 86850 RBC ANTIBODY SCREEN: CPT

## 2023-11-21 RX ORDER — CALCIUM CARBONATE 500 MG/1
500 TABLET, CHEWABLE ORAL 3 TIMES DAILY PRN
Status: DISCONTINUED | OUTPATIENT
Start: 2023-11-21 | End: 2023-11-22

## 2023-11-21 RX ORDER — FAMOTIDINE 20 MG/1
20 TABLET, FILM COATED ORAL EVERY 12 HOURS PRN
Status: DISCONTINUED | OUTPATIENT
Start: 2023-11-21 | End: 2023-11-22

## 2023-11-21 RX ADMIN — FAMOTIDINE 20 MG: 20 TABLET ORAL at 22:44

## 2023-11-21 RX ADMIN — DIPHENHYDRAMINE HYDROCHLORIDE 25 MG: 25 CAPSULE ORAL at 17:48

## 2023-11-21 RX ADMIN — BETAMETHASONE ACETATE AND BETAMETHASONE SODIUM PHOSPHATE 12 MG: 3; 3 INJECTION, SUSPENSION INTRA-ARTICULAR; INTRALESIONAL; INTRAMUSCULAR; SOFT TISSUE at 17:57

## 2023-11-21 RX ADMIN — ACETAMINOPHEN 650 MG: 325 TABLET, FILM COATED ORAL at 17:47

## 2023-11-21 NOTE — PROGRESS NOTES
Progress Note    Second IVIG infusion infusing now. I presented to bedside to discuss clinical picture with patient. We reviewed that blood pressures have been reassuring today, and that no further signs/symptoms concerning for pre-eclampsia. We discussed plans for delivery on Friday given diagnosis of gestational hypertension. She very much desires  if possible but is amenable to repeat C/S. SVE this PM - external os 1, internal os closed, 50% effaced, -3 fetal station. We discussed if decides on , that would require IOL. We reviewed induction process with prior C/S which includes ripening with CRB vs. Low-dose Oxytocin protocol. We discussed if platelets remain at current level, I would recommend repeat C/S due to concerns regarding prolonged induction course in setting of thrombocytopenia. After discussion, would like to see what platelet count is in morning before determining delivery plan further.  We will monitor overnight and repeat labs in AM.

## 2023-11-21 NOTE — PROGRESS NOTES
Discussed plan of care with Donnie Montoya and Gifty Chavis. Will administer second dose of IVIG to treat ITP. Patient may be discharged home following completion if stable.  If staying overnight repeat labs in AM.

## 2023-11-22 ENCOUNTER — ANESTHESIA EVENT (OUTPATIENT)
Dept: OPERATING ROOM | Age: 28
End: 2023-11-22
Payer: COMMERCIAL

## 2023-11-22 ENCOUNTER — ANESTHESIA (OUTPATIENT)
Dept: OPERATING ROOM | Age: 28
End: 2023-11-22
Payer: COMMERCIAL

## 2023-11-22 PROBLEM — O34.219 MATERNAL CARE FOR UTERINE SCAR DUE TO PREVIOUS CESAREAN SECTION, DELIVERED: Status: ACTIVE | Noted: 2023-11-22

## 2023-11-22 PROBLEM — O14.23 HELLP SYNDROME (HELLP), THIRD TRIMESTER: Status: ACTIVE | Noted: 2023-11-22

## 2023-11-22 PROBLEM — O13.3 GESTATIONAL HTN W/O SIGNIFICANT PROTEINURIA, THIRD TRIMESTER: Status: ACTIVE | Noted: 2023-11-22

## 2023-11-22 LAB
ALBUMIN SERPL-MCNC: 2.4 G/DL (ref 3.5–5)
ALBUMIN SERPL-MCNC: 2.6 G/DL (ref 3.5–5)
ALBUMIN/GLOB SERPL: 0.5 (ref 0.4–1.6)
ALBUMIN/GLOB SERPL: 0.5 (ref 0.4–1.6)
ALP SERPL-CCNC: 177 U/L (ref 50–130)
ALP SERPL-CCNC: 190 U/L (ref 50–130)
ALT SERPL-CCNC: 76 U/L (ref 12–65)
ALT SERPL-CCNC: 80 U/L (ref 12–65)
ANION GAP SERPL CALC-SCNC: 7 MMOL/L (ref 2–11)
ANION GAP SERPL CALC-SCNC: 9 MMOL/L (ref 2–11)
AST SERPL-CCNC: 73 U/L (ref 15–37)
AST SERPL-CCNC: 73 U/L (ref 15–37)
BASE DEFICIT BLD-SCNC: 5.2 MMOL/L
BASOPHILS # BLD: 0 K/UL (ref 0–0.2)
BASOPHILS # BLD: 0.1 K/UL (ref 0–0.2)
BASOPHILS NFR BLD: 0 % (ref 0–2)
BASOPHILS NFR BLD: 1 % (ref 0–2)
BILIRUB SERPL-MCNC: 0.2 MG/DL (ref 0.2–1.1)
BILIRUB SERPL-MCNC: 0.2 MG/DL (ref 0.2–1.1)
BUN SERPL-MCNC: 8 MG/DL (ref 6–23)
BUN SERPL-MCNC: 8 MG/DL (ref 6–23)
CALCIUM SERPL-MCNC: 8.4 MG/DL (ref 8.3–10.4)
CALCIUM SERPL-MCNC: 8.5 MG/DL (ref 8.3–10.4)
CHLORIDE SERPL-SCNC: 111 MMOL/L (ref 101–110)
CHLORIDE SERPL-SCNC: 112 MMOL/L (ref 101–110)
CO2 SERPL-SCNC: 20 MMOL/L (ref 21–32)
CO2 SERPL-SCNC: 21 MMOL/L (ref 21–32)
CREAT SERPL-MCNC: 0.76 MG/DL (ref 0.6–1)
CREAT SERPL-MCNC: 0.85 MG/DL (ref 0.6–1)
DIFFERENTIAL METHOD BLD: ABNORMAL
DIFFERENTIAL METHOD BLD: ABNORMAL
EOSINOPHIL # BLD: 0 K/UL (ref 0–0.8)
EOSINOPHIL # BLD: 0 K/UL (ref 0–0.8)
EOSINOPHIL NFR BLD: 0 % (ref 0.5–7.8)
EOSINOPHIL NFR BLD: 0 % (ref 0.5–7.8)
ERYTHROCYTE [DISTWIDTH] IN BLOOD BY AUTOMATED COUNT: 15.4 % (ref 11.9–14.6)
ERYTHROCYTE [DISTWIDTH] IN BLOOD BY AUTOMATED COUNT: 15.6 % (ref 11.9–14.6)
ERYTHROCYTE [DISTWIDTH] IN BLOOD BY AUTOMATED COUNT: 15.9 % (ref 11.9–14.6)
FIBRINOGEN PPP-MCNC: 386 MG/DL (ref 190–501)
FIBRINOGEN PPP-MCNC: 408 MG/DL (ref 190–501)
GLOBULIN SER CALC-MCNC: 4.5 G/DL (ref 2.8–4.5)
GLOBULIN SER CALC-MCNC: 5.2 G/DL (ref 2.8–4.5)
GLUCOSE SERPL-MCNC: 103 MG/DL (ref 65–100)
GLUCOSE SERPL-MCNC: 125 MG/DL (ref 65–100)
HCO3 BLDV-SCNC: 21.3 MMOL/L (ref 23–28)
HCT VFR BLD AUTO: 35.5 % (ref 35.8–46.3)
HCT VFR BLD AUTO: 37.9 % (ref 35.8–46.3)
HCT VFR BLD AUTO: 39.1 % (ref 35.8–46.3)
HGB BLD-MCNC: 11.2 G/DL (ref 11.7–15.4)
HGB BLD-MCNC: 11.9 G/DL (ref 11.7–15.4)
HGB BLD-MCNC: 12 G/DL (ref 11.7–15.4)
HISTORY CHECK: NORMAL
IMM GRANULOCYTES # BLD AUTO: 0.1 K/UL (ref 0–0.5)
IMM GRANULOCYTES # BLD AUTO: 0.3 K/UL (ref 0–0.5)
IMM GRANULOCYTES NFR BLD AUTO: 1 % (ref 0–5)
IMM GRANULOCYTES NFR BLD AUTO: 3 % (ref 0–5)
LDH SERPL L TO P-CCNC: 417 U/L (ref 100–190)
LYMPHOCYTES # BLD: 1.3 K/UL (ref 0.5–4.6)
LYMPHOCYTES # BLD: 1.4 K/UL (ref 0.5–4.6)
LYMPHOCYTES NFR BLD: 15 % (ref 13–44)
LYMPHOCYTES NFR BLD: 16 % (ref 13–44)
MCH RBC QN AUTO: 24.4 PG (ref 26.1–32.9)
MCH RBC QN AUTO: 24.5 PG (ref 26.1–32.9)
MCH RBC QN AUTO: 24.7 PG (ref 26.1–32.9)
MCHC RBC AUTO-ENTMCNC: 30.7 G/DL (ref 31.4–35)
MCHC RBC AUTO-ENTMCNC: 31.4 G/DL (ref 31.4–35)
MCHC RBC AUTO-ENTMCNC: 31.5 G/DL (ref 31.4–35)
MCV RBC AUTO: 77.8 FL (ref 82–102)
MCV RBC AUTO: 78.2 FL (ref 82–102)
MCV RBC AUTO: 79.8 FL (ref 82–102)
MONOCYTES # BLD: 0.3 K/UL (ref 0.1–1.3)
MONOCYTES # BLD: 0.6 K/UL (ref 0.1–1.3)
MONOCYTES NFR BLD: 4 % (ref 4–12)
MONOCYTES NFR BLD: 7 % (ref 4–12)
NEUTS SEG # BLD: 6.6 K/UL (ref 1.7–8.2)
NEUTS SEG # BLD: 6.9 K/UL (ref 1.7–8.2)
NEUTS SEG NFR BLD: 76 % (ref 43–78)
NEUTS SEG NFR BLD: 77 % (ref 43–78)
NRBC # BLD: 0 K/UL (ref 0–0.2)
NRBC # BLD: 0.02 K/UL (ref 0–0.2)
NRBC # BLD: 0.03 K/UL (ref 0–0.2)
PCO2 BLDCO: 44 MMHG (ref 32–68)
PH BLDCO: 7.3 (ref 7.15–7.38)
PLATELET # BLD AUTO: 65 K/UL (ref 150–450)
PLATELET # BLD AUTO: 72 K/UL (ref 150–450)
PLATELET # BLD AUTO: 78 K/UL (ref 150–450)
PLATELET COMMENT: ABNORMAL
PLATELET COMMENT: ABNORMAL
PMV BLD AUTO: ABNORMAL FL (ref 9.4–12.3)
PO2 BLDCO: 29 MMHG
POTASSIUM SERPL-SCNC: 4 MMOL/L (ref 3.5–5.1)
POTASSIUM SERPL-SCNC: 4.2 MMOL/L (ref 3.5–5.1)
PROT SERPL-MCNC: 6.9 G/DL (ref 6.3–8.2)
PROT SERPL-MCNC: 7.8 G/DL (ref 6.3–8.2)
RBC # BLD AUTO: 4.54 M/UL (ref 4.05–5.2)
RBC # BLD AUTO: 4.87 M/UL (ref 4.05–5.2)
RBC # BLD AUTO: 4.9 M/UL (ref 4.05–5.2)
RBC MORPH BLD: ABNORMAL
SAO2 % BLDV: 48.8 % (ref 65–88)
SERVICE CMNT-IMP: ABNORMAL
SODIUM SERPL-SCNC: 140 MMOL/L (ref 133–143)
SODIUM SERPL-SCNC: 140 MMOL/L (ref 133–143)
SPECIMEN TYPE: ABNORMAL
WBC # BLD AUTO: 8 K/UL (ref 4.3–11.1)
WBC # BLD AUTO: 8.7 K/UL (ref 4.3–11.1)
WBC # BLD AUTO: 8.9 K/UL (ref 4.3–11.1)
WBC MORPH BLD: ABNORMAL
WBC MORPH BLD: ABNORMAL

## 2023-11-22 PROCEDURE — 6360000002 HC RX W HCPCS: Performed by: ANESTHESIOLOGY

## 2023-11-22 PROCEDURE — 3609079900 HC CESAREAN SECTION: Performed by: OBSTETRICS & GYNECOLOGY

## 2023-11-22 PROCEDURE — 1100000000 HC RM PRIVATE

## 2023-11-22 PROCEDURE — 85027 COMPLETE CBC AUTOMATED: CPT

## 2023-11-22 PROCEDURE — 2580000003 HC RX 258: Performed by: OBSTETRICS & GYNECOLOGY

## 2023-11-22 PROCEDURE — 64488 TAP BLOCK BI INJECTION: CPT | Performed by: ANESTHESIOLOGY

## 2023-11-22 PROCEDURE — 82803 BLOOD GASES ANY COMBINATION: CPT

## 2023-11-22 PROCEDURE — 2720000010 HC SURG SUPPLY STERILE: Performed by: OBSTETRICS & GYNECOLOGY

## 2023-11-22 PROCEDURE — 85025 COMPLETE CBC W/AUTO DIFF WBC: CPT

## 2023-11-22 PROCEDURE — 2500000003 HC RX 250 WO HCPCS: Performed by: ANESTHESIOLOGY

## 2023-11-22 PROCEDURE — 88302 TISSUE EXAM BY PATHOLOGIST: CPT

## 2023-11-22 PROCEDURE — 6370000000 HC RX 637 (ALT 250 FOR IP): Performed by: OBSTETRICS & GYNECOLOGY

## 2023-11-22 PROCEDURE — 36415 COLL VENOUS BLD VENIPUNCTURE: CPT

## 2023-11-22 PROCEDURE — 7100000001 HC PACU RECOVERY - ADDTL 15 MIN: Performed by: OBSTETRICS & GYNECOLOGY

## 2023-11-22 PROCEDURE — 2500000003 HC RX 250 WO HCPCS: Performed by: NURSE ANESTHETIST, CERTIFIED REGISTERED

## 2023-11-22 PROCEDURE — 36600 WITHDRAWAL OF ARTERIAL BLOOD: CPT

## 2023-11-22 PROCEDURE — 3700000000 HC ANESTHESIA ATTENDED CARE: Performed by: OBSTETRICS & GYNECOLOGY

## 2023-11-22 PROCEDURE — A4216 STERILE WATER/SALINE, 10 ML: HCPCS | Performed by: ANESTHESIOLOGY

## 2023-11-22 PROCEDURE — 80053 COMPREHEN METABOLIC PANEL: CPT

## 2023-11-22 PROCEDURE — 6360000002 HC RX W HCPCS: Performed by: NURSE ANESTHETIST, CERTIFIED REGISTERED

## 2023-11-22 PROCEDURE — 2709999900 HC NON-CHARGEABLE SUPPLY: Performed by: OBSTETRICS & GYNECOLOGY

## 2023-11-22 PROCEDURE — G0378 HOSPITAL OBSERVATION PER HR: HCPCS

## 2023-11-22 PROCEDURE — 99233 SBSQ HOSP IP/OBS HIGH 50: CPT | Performed by: OBSTETRICS & GYNECOLOGY

## 2023-11-22 PROCEDURE — 7100000000 HC PACU RECOVERY - FIRST 15 MIN: Performed by: OBSTETRICS & GYNECOLOGY

## 2023-11-22 PROCEDURE — 6360000002 HC RX W HCPCS: Performed by: OBSTETRICS & GYNECOLOGY

## 2023-11-22 PROCEDURE — 83615 LACTATE (LD) (LDH) ENZYME: CPT

## 2023-11-22 PROCEDURE — 85384 FIBRINOGEN ACTIVITY: CPT

## 2023-11-22 PROCEDURE — 2500000003 HC RX 250 WO HCPCS: Performed by: OBSTETRICS & GYNECOLOGY

## 2023-11-22 PROCEDURE — 6370000000 HC RX 637 (ALT 250 FOR IP): Performed by: ANESTHESIOLOGY

## 2023-11-22 PROCEDURE — 2580000003 HC RX 258: Performed by: ANESTHESIOLOGY

## 2023-11-22 PROCEDURE — 3700000001 HC ADD 15 MINUTES (ANESTHESIA): Performed by: OBSTETRICS & GYNECOLOGY

## 2023-11-22 PROCEDURE — 0UB70ZZ EXCISION OF BILATERAL FALLOPIAN TUBES, OPEN APPROACH: ICD-10-PCS | Performed by: OBSTETRICS & GYNECOLOGY

## 2023-11-22 RX ORDER — LABETALOL HYDROCHLORIDE 5 MG/ML
40 INJECTION, SOLUTION INTRAVENOUS
Status: DISPENSED | OUTPATIENT
Start: 2023-11-22 | End: 2023-11-23

## 2023-11-22 RX ORDER — ONDANSETRON 2 MG/ML
INJECTION INTRAMUSCULAR; INTRAVENOUS PRN
Status: DISCONTINUED | OUTPATIENT
Start: 2023-11-22 | End: 2023-11-22 | Stop reason: SDUPTHER

## 2023-11-22 RX ORDER — TRANEXAMIC ACID 10 MG/ML
1000 INJECTION, SOLUTION INTRAVENOUS ONCE
Status: COMPLETED | OUTPATIENT
Start: 2023-11-22 | End: 2023-11-22

## 2023-11-22 RX ORDER — SODIUM CHLORIDE 0.9 % (FLUSH) 0.9 %
5-40 SYRINGE (ML) INJECTION PRN
Status: DISCONTINUED | OUTPATIENT
Start: 2023-11-22 | End: 2023-11-22 | Stop reason: HOSPADM

## 2023-11-22 RX ORDER — SUCCINYLCHOLINE/SOD CL,ISO/PF 200MG/10ML
SYRINGE (ML) INTRAVENOUS PRN
Status: DISCONTINUED | OUTPATIENT
Start: 2023-11-22 | End: 2023-11-22 | Stop reason: SDUPTHER

## 2023-11-22 RX ORDER — PROPOFOL 10 MG/ML
INJECTION, EMULSION INTRAVENOUS PRN
Status: DISCONTINUED | OUTPATIENT
Start: 2023-11-22 | End: 2023-11-22 | Stop reason: SDUPTHER

## 2023-11-22 RX ORDER — LIDOCAINE HYDROCHLORIDE 20 MG/ML
INJECTION, SOLUTION EPIDURAL; INFILTRATION; INTRACAUDAL; PERINEURAL PRN
Status: DISCONTINUED | OUTPATIENT
Start: 2023-11-22 | End: 2023-11-22 | Stop reason: SDUPTHER

## 2023-11-22 RX ORDER — METOCLOPRAMIDE HYDROCHLORIDE 5 MG/ML
10 INJECTION INTRAMUSCULAR; INTRAVENOUS ONCE
Status: COMPLETED | OUTPATIENT
Start: 2023-11-22 | End: 2023-11-22

## 2023-11-22 RX ORDER — NIFEDIPINE 30 MG/1
30 TABLET, EXTENDED RELEASE ORAL DAILY
Status: DISCONTINUED | OUTPATIENT
Start: 2023-11-22 | End: 2023-11-25 | Stop reason: HOSPADM

## 2023-11-22 RX ORDER — CALCIUM GLUCONATE 94 MG/ML
1000 INJECTION, SOLUTION INTRAVENOUS PRN
Status: DISCONTINUED | OUTPATIENT
Start: 2023-11-22 | End: 2023-11-25 | Stop reason: HOSPADM

## 2023-11-22 RX ORDER — SODIUM CHLORIDE 0.9 % (FLUSH) 0.9 %
10 SYRINGE (ML) INJECTION PRN
Status: DISCONTINUED | OUTPATIENT
Start: 2023-11-22 | End: 2023-11-22

## 2023-11-22 RX ORDER — PROCHLORPERAZINE EDISYLATE 5 MG/ML
5 INJECTION INTRAMUSCULAR; INTRAVENOUS
Status: ACTIVE | OUTPATIENT
Start: 2023-11-22 | End: 2023-11-23

## 2023-11-22 RX ORDER — OXYCODONE HYDROCHLORIDE 5 MG/1
5 TABLET ORAL PRN
Status: ACTIVE | OUTPATIENT
Start: 2023-11-22 | End: 2023-11-22

## 2023-11-22 RX ORDER — ROPIVACAINE HYDROCHLORIDE 5 MG/ML
INJECTION, SOLUTION EPIDURAL; INFILTRATION; PERINEURAL
Status: DISCONTINUED | OUTPATIENT
Start: 2023-11-22 | End: 2023-11-22 | Stop reason: SDUPTHER

## 2023-11-22 RX ORDER — SODIUM CHLORIDE 0.9 % (FLUSH) 0.9 %
5-40 SYRINGE (ML) INJECTION PRN
Status: DISCONTINUED | OUTPATIENT
Start: 2023-11-22 | End: 2023-11-25 | Stop reason: HOSPADM

## 2023-11-22 RX ORDER — HYDRALAZINE HYDROCHLORIDE 20 MG/ML
10 INJECTION INTRAMUSCULAR; INTRAVENOUS
Status: DISPENSED | OUTPATIENT
Start: 2023-11-22 | End: 2023-11-23

## 2023-11-22 RX ORDER — ONDANSETRON 2 MG/ML
4 INJECTION INTRAMUSCULAR; INTRAVENOUS EVERY 6 HOURS PRN
Status: DISCONTINUED | OUTPATIENT
Start: 2023-11-22 | End: 2023-11-22

## 2023-11-22 RX ORDER — SODIUM CHLORIDE 0.9 % (FLUSH) 0.9 %
5-40 SYRINGE (ML) INJECTION EVERY 12 HOURS SCHEDULED
Status: DISCONTINUED | OUTPATIENT
Start: 2023-11-22 | End: 2023-11-22 | Stop reason: HOSPADM

## 2023-11-22 RX ORDER — LIDOCAINE HYDROCHLORIDE 10 MG/ML
1 INJECTION, SOLUTION INFILTRATION; PERINEURAL
Status: DISCONTINUED | OUTPATIENT
Start: 2023-11-22 | End: 2023-11-22 | Stop reason: HOSPADM

## 2023-11-22 RX ORDER — MAGNESIUM SULFATE HEPTAHYDRATE 40 MG/ML
4000 INJECTION, SOLUTION INTRAVENOUS ONCE
Status: COMPLETED | OUTPATIENT
Start: 2023-11-22 | End: 2023-11-22

## 2023-11-22 RX ORDER — SODIUM CHLORIDE, SODIUM LACTATE, POTASSIUM CHLORIDE, CALCIUM CHLORIDE 600; 310; 30; 20 MG/100ML; MG/100ML; MG/100ML; MG/100ML
INJECTION, SOLUTION INTRAVENOUS CONTINUOUS
Status: DISCONTINUED | OUTPATIENT
Start: 2023-11-22 | End: 2023-11-22

## 2023-11-22 RX ORDER — LABETALOL HYDROCHLORIDE 5 MG/ML
80 INJECTION, SOLUTION INTRAVENOUS
Status: DISPENSED | OUTPATIENT
Start: 2023-11-22 | End: 2023-11-23

## 2023-11-22 RX ORDER — LABETALOL HYDROCHLORIDE 5 MG/ML
20 INJECTION, SOLUTION INTRAVENOUS
Status: COMPLETED | OUTPATIENT
Start: 2023-11-22 | End: 2023-11-22

## 2023-11-22 RX ORDER — HYDROMORPHONE HYDROCHLORIDE 1 MG/ML
0.5 INJECTION, SOLUTION INTRAMUSCULAR; INTRAVENOUS; SUBCUTANEOUS EVERY 4 HOURS PRN
Status: DISCONTINUED | OUTPATIENT
Start: 2023-11-22 | End: 2023-11-25 | Stop reason: HOSPADM

## 2023-11-22 RX ORDER — DEXAMETHASONE SODIUM PHOSPHATE 10 MG/ML
INJECTION, SOLUTION INTRAMUSCULAR; INTRAVENOUS
Status: DISCONTINUED | OUTPATIENT
Start: 2023-11-22 | End: 2023-11-22 | Stop reason: SDUPTHER

## 2023-11-22 RX ORDER — KETOROLAC TROMETHAMINE 30 MG/ML
30 INJECTION, SOLUTION INTRAMUSCULAR; INTRAVENOUS EVERY 6 HOURS PRN
Status: DISCONTINUED | OUTPATIENT
Start: 2023-11-23 | End: 2023-11-22

## 2023-11-22 RX ORDER — FENTANYL CITRATE 50 UG/ML
INJECTION, SOLUTION INTRAMUSCULAR; INTRAVENOUS PRN
Status: DISCONTINUED | OUTPATIENT
Start: 2023-11-22 | End: 2023-11-22 | Stop reason: SDUPTHER

## 2023-11-22 RX ORDER — HYDROMORPHONE HYDROCHLORIDE 1 MG/ML
0.5 INJECTION, SOLUTION INTRAMUSCULAR; INTRAVENOUS; SUBCUTANEOUS EVERY 5 MIN PRN
Status: DISCONTINUED | OUTPATIENT
Start: 2023-11-22 | End: 2023-11-25 | Stop reason: HOSPADM

## 2023-11-22 RX ORDER — HYDROMORPHONE HYDROCHLORIDE 1 MG/ML
0.25 INJECTION, SOLUTION INTRAMUSCULAR; INTRAVENOUS; SUBCUTANEOUS EVERY 5 MIN PRN
Status: DISCONTINUED | OUTPATIENT
Start: 2023-11-22 | End: 2023-11-25 | Stop reason: HOSPADM

## 2023-11-22 RX ORDER — ONDANSETRON 2 MG/ML
4 INJECTION INTRAMUSCULAR; INTRAVENOUS
Status: ACTIVE | OUTPATIENT
Start: 2023-11-22 | End: 2023-11-23

## 2023-11-22 RX ORDER — SODIUM CHLORIDE, SODIUM LACTATE, POTASSIUM CHLORIDE, AND CALCIUM CHLORIDE .6; .31; .03; .02 G/100ML; G/100ML; G/100ML; G/100ML
1000 INJECTION, SOLUTION INTRAVENOUS ONCE
Status: DISCONTINUED | OUTPATIENT
Start: 2023-11-22 | End: 2023-11-22

## 2023-11-22 RX ORDER — SODIUM CHLORIDE 0.9 % (FLUSH) 0.9 %
5-40 SYRINGE (ML) INJECTION EVERY 12 HOURS SCHEDULED
Status: DISCONTINUED | OUTPATIENT
Start: 2023-11-22 | End: 2023-11-22

## 2023-11-22 RX ORDER — SODIUM CHLORIDE, SODIUM LACTATE, POTASSIUM CHLORIDE, CALCIUM CHLORIDE 600; 310; 30; 20 MG/100ML; MG/100ML; MG/100ML; MG/100ML
INJECTION, SOLUTION INTRAVENOUS CONTINUOUS
Status: DISCONTINUED | OUTPATIENT
Start: 2023-11-22 | End: 2023-11-25 | Stop reason: HOSPADM

## 2023-11-22 RX ORDER — CITRIC ACID/SODIUM CITRATE 334-500MG
30 SOLUTION, ORAL ORAL ONCE
Status: COMPLETED | OUTPATIENT
Start: 2023-11-22 | End: 2023-11-22

## 2023-11-22 RX ORDER — LABETALOL HYDROCHLORIDE 5 MG/ML
20 INJECTION, SOLUTION INTRAVENOUS ONCE
Status: COMPLETED | OUTPATIENT
Start: 2023-11-22 | End: 2023-11-22

## 2023-11-22 RX ORDER — SODIUM CHLORIDE 9 MG/ML
INJECTION, SOLUTION INTRAVENOUS PRN
Status: DISCONTINUED | OUTPATIENT
Start: 2023-11-22 | End: 2023-11-25 | Stop reason: HOSPADM

## 2023-11-22 RX ORDER — SODIUM CHLORIDE 0.9 % (FLUSH) 0.9 %
5-40 SYRINGE (ML) INJECTION EVERY 12 HOURS SCHEDULED
Status: DISCONTINUED | OUTPATIENT
Start: 2023-11-22 | End: 2023-11-25 | Stop reason: HOSPADM

## 2023-11-22 RX ORDER — SODIUM CHLORIDE 9 MG/ML
INJECTION, SOLUTION INTRAVENOUS PRN
Status: DISCONTINUED | OUTPATIENT
Start: 2023-11-22 | End: 2023-11-22

## 2023-11-22 RX ORDER — DEXAMETHASONE SODIUM PHOSPHATE 10 MG/ML
INJECTION INTRAMUSCULAR; INTRAVENOUS PRN
Status: DISCONTINUED | OUTPATIENT
Start: 2023-11-22 | End: 2023-11-22 | Stop reason: SDUPTHER

## 2023-11-22 RX ORDER — KETOROLAC TROMETHAMINE 30 MG/ML
INJECTION, SOLUTION INTRAMUSCULAR; INTRAVENOUS PRN
Status: DISCONTINUED | OUTPATIENT
Start: 2023-11-22 | End: 2023-11-22 | Stop reason: SDUPTHER

## 2023-11-22 RX ORDER — OXYCODONE HYDROCHLORIDE 5 MG/1
10 TABLET ORAL PRN
Status: ACTIVE | OUTPATIENT
Start: 2023-11-22 | End: 2023-11-22

## 2023-11-22 RX ORDER — HYDROMORPHONE HYDROCHLORIDE 2 MG/ML
INJECTION, SOLUTION INTRAMUSCULAR; INTRAVENOUS; SUBCUTANEOUS PRN
Status: DISCONTINUED | OUTPATIENT
Start: 2023-11-22 | End: 2023-11-22 | Stop reason: SDUPTHER

## 2023-11-22 RX ORDER — ACETAMINOPHEN 325 MG/1
650 TABLET ORAL EVERY 6 HOURS SCHEDULED
Status: DISCONTINUED | OUTPATIENT
Start: 2023-11-22 | End: 2023-11-23 | Stop reason: DRUGHIGH

## 2023-11-22 RX ORDER — DIPHENHYDRAMINE HYDROCHLORIDE 50 MG/ML
12.5 INJECTION INTRAMUSCULAR; INTRAVENOUS
Status: ACTIVE | OUTPATIENT
Start: 2023-11-22 | End: 2023-11-23

## 2023-11-22 RX ORDER — SODIUM CHLORIDE 9 MG/ML
INJECTION, SOLUTION INTRAVENOUS PRN
Status: DISCONTINUED | OUTPATIENT
Start: 2023-11-22 | End: 2023-11-22 | Stop reason: HOSPADM

## 2023-11-22 RX ADMIN — LABETALOL HYDROCHLORIDE 20 MG: 5 INJECTION, SOLUTION INTRAVENOUS at 15:57

## 2023-11-22 RX ADMIN — ONDANSETRON 4 MG: 2 INJECTION INTRAMUSCULAR; INTRAVENOUS at 17:22

## 2023-11-22 RX ADMIN — CEFAZOLIN 2000 MG: 10 INJECTION, POWDER, FOR SOLUTION INTRAVENOUS at 16:46

## 2023-11-22 RX ADMIN — NIFEDIPINE 30 MG: 30 TABLET, EXTENDED RELEASE ORAL at 19:32

## 2023-11-22 RX ADMIN — PROPOFOL 20 MG: 10 INJECTION, EMULSION INTRAVENOUS at 17:26

## 2023-11-22 RX ADMIN — Medication 500 ML/HR: at 17:16

## 2023-11-22 RX ADMIN — SODIUM CITRATE AND CITRIC ACID MONOHYDRATE 30 ML: 334; 500 SOLUTION ORAL at 16:46

## 2023-11-22 RX ADMIN — DIPHENHYDRAMINE HYDROCHLORIDE 25 MG: 25 CAPSULE ORAL at 00:06

## 2023-11-22 RX ADMIN — SODIUM CHLORIDE, SODIUM LACTATE, POTASSIUM CHLORIDE, AND CALCIUM CHLORIDE: 600; 310; 30; 20 INJECTION, SOLUTION INTRAVENOUS at 17:54

## 2023-11-22 RX ADMIN — SODIUM CHLORIDE, POTASSIUM CHLORIDE, SODIUM LACTATE AND CALCIUM CHLORIDE: 600; 310; 30; 20 INJECTION, SOLUTION INTRAVENOUS at 19:15

## 2023-11-22 RX ADMIN — PROPOFOL 180 MG: 10 INJECTION, EMULSION INTRAVENOUS at 17:10

## 2023-11-22 RX ADMIN — TRANEXAMIC ACID 1000 MG: 10 INJECTION, SOLUTION INTRAVENOUS at 19:16

## 2023-11-22 RX ADMIN — METOCLOPRAMIDE 10 MG: 5 INJECTION, SOLUTION INTRAMUSCULAR; INTRAVENOUS at 16:46

## 2023-11-22 RX ADMIN — KETOROLAC TROMETHAMINE 30 MG: 30 INJECTION, SOLUTION INTRAMUSCULAR at 18:19

## 2023-11-22 RX ADMIN — ACETAMINOPHEN 650 MG: 325 TABLET, FILM COATED ORAL at 20:32

## 2023-11-22 RX ADMIN — LIDOCAINE HYDROCHLORIDE 100 MG: 20 INJECTION, SOLUTION EPIDURAL; INFILTRATION; INTRACAUDAL; PERINEURAL at 17:10

## 2023-11-22 RX ADMIN — LABETALOL HYDROCHLORIDE 20 MG: 5 INJECTION INTRAVENOUS at 19:11

## 2023-11-22 RX ADMIN — FENTANYL CITRATE 50 MCG: 50 INJECTION, SOLUTION INTRAMUSCULAR; INTRAVENOUS at 17:26

## 2023-11-22 RX ADMIN — ROPIVACAINE HYDROCHLORIDE 30 ML: 5 INJECTION, SOLUTION EPIDURAL; INFILTRATION; PERINEURAL at 18:14

## 2023-11-22 RX ADMIN — FAMOTIDINE 20 MG: 10 INJECTION, SOLUTION INTRAVENOUS at 16:46

## 2023-11-22 RX ADMIN — FENTANYL CITRATE 50 MCG: 50 INJECTION, SOLUTION INTRAMUSCULAR; INTRAVENOUS at 17:17

## 2023-11-22 RX ADMIN — HYDROMORPHONE HYDROCHLORIDE 1 MG: 2 INJECTION INTRAMUSCULAR; INTRAVENOUS; SUBCUTANEOUS at 17:26

## 2023-11-22 RX ADMIN — Medication 140 MG: at 17:11

## 2023-11-22 RX ADMIN — SODIUM CHLORIDE, SODIUM LACTATE, POTASSIUM CHLORIDE, AND CALCIUM CHLORIDE: 600; 310; 30; 20 INJECTION, SOLUTION INTRAVENOUS at 16:59

## 2023-11-22 RX ADMIN — ACETAMINOPHEN 650 MG: 325 TABLET, FILM COATED ORAL at 00:06

## 2023-11-22 RX ADMIN — DEXAMETHASONE SODIUM PHOSPHATE 10 MG: 10 INJECTION, SOLUTION INTRAMUSCULAR; INTRAVENOUS at 18:14

## 2023-11-22 RX ADMIN — MAGNESIUM SULFATE HEPTAHYDRATE 4000 MG: 40 INJECTION, SOLUTION INTRAVENOUS at 19:17

## 2023-11-22 RX ADMIN — HYDROMORPHONE HYDROCHLORIDE 1 MG: 2 INJECTION INTRAMUSCULAR; INTRAVENOUS; SUBCUTANEOUS at 17:18

## 2023-11-22 RX ADMIN — MAGNESIUM SULFATE 2000 MG/HR: 500 INJECTION, SOLUTION INTRAMUSCULAR; INTRAVENOUS at 19:38

## 2023-11-22 RX ADMIN — HYDROMORPHONE HYDROCHLORIDE 0.5 MG: 1 INJECTION, SOLUTION INTRAMUSCULAR; INTRAVENOUS; SUBCUTANEOUS at 19:16

## 2023-11-22 RX ADMIN — DEXAMETHASONE SODIUM PHOSPHATE 8 MG: 10 INJECTION INTRAMUSCULAR; INTRAVENOUS at 17:22

## 2023-11-22 ASSESSMENT — PAIN DESCRIPTION - DESCRIPTORS: DESCRIPTORS: ACHING

## 2023-11-22 ASSESSMENT — PAIN DESCRIPTION - LOCATION: LOCATION: HEAD

## 2023-11-22 ASSESSMENT — PAIN SCALES - GENERAL
PAINLEVEL_OUTOF10: 8
PAINLEVEL_OUTOF10: 2

## 2023-11-22 NOTE — PROGRESS NOTES
[address] [place] [address] FETAL MEDICINE    [address] [place] [address]  P- [phone]  a-[phone]          [facility] Antepartum Progress Note- Chart review    33 y.o. [id number] at 36w5d by Estimated Date of Delivery: 12/15/23. Patient admitted for  ITP and new HELLP with elevated BP . Review of Systems: per HPI, otherwise unremarkable. Current Facility-Administered Medications:     0.9 % sodium chloride infusion, , IntraVENous, PRN, Eliseo Rivas MD    lactated ringers IV soln infusion, , IntraVENous, Continuous, Eliseo Rivas MD    lactated ringers bolus bolus 1,000 mL, 1,000 mL, IntraVENous, Once, Eliseo Rivas MD    sodium chloride flush 0.9 % injection 5-40 mL, 5-40 mL, IntraVENous, 2 times per day, Eliseo Rivas MD    sodium chloride flush 0.9 % injection 10 mL, 10 mL, IntraVENous, PRN, Eliseo Rivas MD    0.9 % sodium chloride infusion, , IntraVENous, PRN, Eliseo Rivas MD    oxytocin (PITOCIN) 30 units in 500 mL infusion, 87.3 mala-units/min, IntraVENous, Continuous PRN **AND** oxytocin (PITOCIN) 10 unit bolus from the bag, 10 Units, IntraVENous, PRN, Eliseo Rivas MD    ondansetron [facility]) injection 4 mg, 4 mg, IntraVENous, Q6H PRN, Eliseo Rivas MD    famotidine (PEPCID) tablet 20 mg, 20 mg, Oral, Q12H PRN, Jaxson Dove MD, 20 mg at 11/21/23 2244    calcium carbonate (TUMS) chewable tablet 500 mg, 500 mg, Oral, TID PRN, Jaxson Dove MD    diphenhydrAMINE (BENADRYL) capsule 25 mg, 25 mg, Oral, Q6H PRN, Eliseo Rivas MD, 25 mg at 11/22/23 0006    acetaminophen (TYLENOL) tablet 650 mg, 650 mg, Oral, Q6H PRN, Eliseo Rivas MD, 650 mg at 11/22/23 0006    Vitals:    Vitals:    11/22/23 0011 11/22/23 0545 11/22/23 1107 11/22/23 1137   BP: (!) 146/78 137/73 (!) 151/81 (!) 150/77   Pulse: 56 52 (!) 48 51   Resp:       Temp:       TempSrc:       SpO2:             Labs:     inptlabs: All lab results for the last 24 hours reviewed. Lab results reviewed.  For significant abnormal values and values

## 2023-11-22 NOTE — ANESTHESIA PRE PROCEDURE
02:33 PM    ALKPHOS 190 11/22/2023 02:33 PM    ALKPHOS 105 04/01/2021 09:17 PM    AST 73 11/22/2023 02:33 PM    ALT 80 11/22/2023 02:33 PM       POC Tests:   Recent Labs     11/21/23  1821   POCGLU 118*       Coags: No results found for: \"PROTIME\", \"INR\", \"APTT\"    HCG (If Applicable): No results found for: \"PREGTESTUR\", \"PREGSERUM\", \"HCG\", \"HCGQUANT\"     ABGs: No results found for: \"PHART\", \"PO2ART\", \"SXL4UAA\", \"QLS0WNY\", \"BEART\", \"F8GTGNFB\"     Type & Screen (If Applicable):  No results found for: \"LABABO\", \"LABRH\"    Drug/Infectious Status (If Applicable):  Lab Results   Component Value Date/Time    HEPCAB NONREACTIVE 11/02/2023 09:16 AM       COVID-19 Screening (If Applicable): No results found for: \"COVID19\"        Anesthesia Evaluation  Patient summary reviewed and Nursing notes reviewed no history of anesthetic complications:   Airway: Mallampati: III  TM distance: >3 FB   Neck ROM: full  Comment: Reported easy to intubate with her C/section in 2015  Mouth opening: > = 3 FB   Dental: normal exam         Pulmonary:Negative Pulmonary ROS breath sounds clear to auscultation                             Cardiovascular:  Exercise tolerance: good (>4 METS),   (+) hypertension (Gestational HTN):,         Rhythm: regular  Rate: normal                    Neuro/Psych:   (+) headaches: migraine headaches,             GI/Hepatic/Renal:   (+) liver disease (with HELLP):,           Endo/Other:    (+) blood dyscrasia (HELLP): thrombocytopenia:., .                 Abdominal:             Vascular: Other Findings:           Anesthesia Plan      general     ASA 3 - emergent     (Need urgent C/section for HELLP syndrome and HTN, plan GETA, discussed possible TAP blocks for postop pain)  Induction: intravenous. MIPS: Postoperative opioids intended and Prophylactic antiemetics administered. Anesthetic plan and risks discussed with patient and spouse.                         Glory Romano MD   11/22/2023

## 2023-11-22 NOTE — PROGRESS NOTES
K/UL    Monocytes Absolute 0.3 0.1 - 1.3 K/UL    Eosinophils Absolute 0.0 0.0 - 0.8 K/UL    Basophils Absolute 0.1 0.0 - 0.2 K/UL    Absolute Immature Granulocyte 0.1 0.0 - 0.5 K/UL    RBC Comment SLIGHT  ANISOCYTOSIS        RBC Comment SLIGHT  POLYCHROMASIA        RBC Comment SLIGHT  MICROCYTOSIS        WBC Comment Result Confirmed By Smear      Platelet Comment SLIGHT      Differential Type AUTOMATED     Comprehensive Metabolic Panel    Collection Time: 11/21/23  7:07 AM   Result Value Ref Range    Sodium 140 133 - 143 mmol/L    Potassium 4.0 3.5 - 5.1 mmol/L    Chloride 113 (H) 101 - 110 mmol/L    CO2 18 (L) 21 - 32 mmol/L    Anion Gap 9 2 - 11 mmol/L    Glucose 108 (H) 65 - 100 mg/dL    BUN 8 6 - 23 MG/DL    Creatinine 0.74 0.6 - 1.0 MG/DL    Est, Glom Filt Rate >60 >60 ml/min/1.73m2    Calcium 8.9 8.3 - 10.4 MG/DL    Total Bilirubin 0.2 0.2 - 1.1 MG/DL    ALT 62 12 - 65 U/L    AST 72 (H) 15 - 37 U/L    Alk Phosphatase 196 (H) 50 - 136 U/L    Total Protein 6.9 6.3 - 8.2 g/dL    Albumin 2.4 (L) 3.5 - 5.0 g/dL    Globulin 4.5 2.8 - 4.5 g/dL    Albumin/Globulin Ratio 0.5 0.4 - 1.6     TYPE AND SCREEN    Collection Time: 11/21/23  7:18 AM   Result Value Ref Range    Crossmatch expiration date 11/24/2023,2352     ABO/Rh A POSITIVE     Antibody Screen NEG    POCT Glucose    Collection Time: 11/21/23  9:23 AM   Result Value Ref Range    POC Glucose 123 (H) 65 - 100 mg/dL    Performed by: Radha    POCT Glucose    Collection Time: 11/21/23  1:19 PM   Result Value Ref Range    POC Glucose 108 (H) 65 - 100 mg/dL    Performed by: Tanner    POCT Glucose    Collection Time: 11/21/23  6:21 PM   Result Value Ref Range    POC Glucose 118 (H) 65 - 100 mg/dL    Performed by: Tanner    CBC    Collection Time: 11/22/23  7:18 AM   Result Value Ref Range    WBC 8.0 4.3 - 11.1 K/uL    RBC 4.54 4.05 - 5.2 M/uL    Hemoglobin 11.2 (L) 11.7 - 15.4 g/dL    Hematocrit 35.5 (L) 35.8 - 46.3 %    MCV 78.2 (L) 82.0 - qualification of risk reducing bilateral salpingectomy at time of repeat  section. R/B/A with the C/S and salpingectomy reviewed. Risk hemorrhage. Risk hysterectomy. Risk sterilization. Risk regret. All questions answered.      Marycruz Russell MD

## 2023-11-22 NOTE — CONSULTS
Clinical Ethics Consultation Note    Other    Consulted by Dr. Felton Garcia for this 28F who has requested a salpingectomy to reduce the risk of cancer at the time of . ? The patient has increased risk for breast, ovarian, and/or uterine cancer due to family history of breast cancer, pre-diabetes, and ITP. The patient has been counseled regarding irreversible infertility. This procedure is consistent with the ERDs, as there is a strong likelihood of existing pathology in her fallopian tubes, and the procedure is morally justified. ? For questions or concerns regarding this consultation, please call me directly.     Deja Mccallum, [address] [facility], [address] [facility] [address]  Ethics Lead  [phone]

## 2023-11-22 NOTE — L&D DELIVERY NOTE
[address] [[id number]]      Labor Events     Labor: No   Steroids: Full Course  Cervical Ripening Date/Time:      Antibiotics Received during Labor: No  Rupture Date/Time:      Rupture Type:  Intact  Augmentation: None  Labor Complications: Pre-eclampsia   Additional Complications:  OB: DELIVERY - COMPLICATIONS   Thrombocytopenia (HCC)             Anesthesia    Method: General       Labor Length    3rd stage: 0h 01m       Delivery Details      Delivery Date: 23 Delivery Time: 17:15:00   Delivery Type: , Low Transverse  Indications for : Prior Uterine Surgery       Skin Incision Type: Low Transverse  Uterine Incision: Low Transverse        Presentation    Presentation: Vertex  Position: Right  _: Occiput  _: Anterior       Assisted Delivery Details    Forceps Attempted?: No  Vacuum Extractor Attempted?: No                                                             Cord    Vessels: 3 Vessels  Complications: None  Delayed Cord Clamping?: No  Cord Blood Disposition: Lab  Gases Sent?: Yes              Placenta    Date/Time: 2023 17:16:00  Removal: Manual Removal  Appearance: Intact  Disposition: Discarded       Lacerations    Episiotomy: None  Perineal Lacerations: None  Other Lacerations: no non-perineal laceration  Number of Repair Packets: 0       Vaginal Counts    Initial Count Personnel: N/A  Initial Count Verified By: N/A  Final Count Personnel: N/A  Final Count Verified By: N/A       Blood Loss  Mother: Ana Paula Vick #[id number]     Start of Mother's Information      Delivery Blood Loss  23 1704 - 23 1857      Quantitative Blood Loss (mL) Hospital Encounter 365 grams    Total  365 mL               End of Mother's Information  Mother: Ana Paula Vick #[id number]                Delivery Providers    Delivering clinician: Awais Quiles MD     Provider Role    Awais Quiles MD Obstetrician    Eleanore Closs, FUNMILAYO Primary Nurse

## 2023-11-23 LAB
ALBUMIN SERPL-MCNC: 2.1 G/DL (ref 3.5–5)
ALBUMIN/GLOB SERPL: 0.5 (ref 0.4–1.6)
ALP SERPL-CCNC: 147 U/L (ref 50–136)
ALT SERPL-CCNC: 92 U/L (ref 12–65)
ANION GAP SERPL CALC-SCNC: 8 MMOL/L (ref 2–11)
AST SERPL-CCNC: 96 U/L (ref 15–37)
BILIRUB SERPL-MCNC: 0.2 MG/DL (ref 0.2–1.1)
BUN SERPL-MCNC: 6 MG/DL (ref 6–23)
CALCIUM SERPL-MCNC: 7.3 MG/DL (ref 8.3–10.4)
CHLORIDE SERPL-SCNC: 111 MMOL/L (ref 101–110)
CO2 SERPL-SCNC: 21 MMOL/L (ref 21–32)
CREAT SERPL-MCNC: 0.82 MG/DL (ref 0.6–1)
ERYTHROCYTE [DISTWIDTH] IN BLOOD BY AUTOMATED COUNT: 15.3 % (ref 11.9–14.6)
GLOBULIN SER CALC-MCNC: 4.3 G/DL (ref 2.8–4.5)
GLUCOSE SERPL-MCNC: 144 MG/DL (ref 65–100)
HCT VFR BLD AUTO: 32.5 % (ref 35.8–46.3)
HGB BLD-MCNC: 10.2 G/DL (ref 11.7–15.4)
MAGNESIUM SERPL-MCNC: 5 MG/DL (ref 1.8–2.4)
MCH RBC QN AUTO: 24.2 PG (ref 26.1–32.9)
MCHC RBC AUTO-ENTMCNC: 31.4 G/DL (ref 31.4–35)
MCV RBC AUTO: 77.2 FL (ref 82–102)
NRBC # BLD: 0 K/UL (ref 0–0.2)
PLATELET # BLD AUTO: 66 K/UL (ref 150–450)
PMV BLD AUTO: ABNORMAL FL (ref 9.4–12.3)
POTASSIUM SERPL-SCNC: 4.1 MMOL/L (ref 3.5–5.1)
PROT SERPL-MCNC: 6.4 G/DL (ref 6.3–8.2)
RBC # BLD AUTO: 4.21 M/UL (ref 4.05–5.2)
SODIUM SERPL-SCNC: 140 MMOL/L (ref 133–143)
WBC # BLD AUTO: 12.7 K/UL (ref 4.3–11.1)

## 2023-11-23 PROCEDURE — 6370000000 HC RX 637 (ALT 250 FOR IP): Performed by: OBSTETRICS & GYNECOLOGY

## 2023-11-23 PROCEDURE — 80053 COMPREHEN METABOLIC PANEL: CPT

## 2023-11-23 PROCEDURE — 1100000000 HC RM PRIVATE

## 2023-11-23 PROCEDURE — 85027 COMPLETE CBC AUTOMATED: CPT

## 2023-11-23 PROCEDURE — 2580000003 HC RX 258: Performed by: OBSTETRICS & GYNECOLOGY

## 2023-11-23 PROCEDURE — 6360000002 HC RX W HCPCS: Performed by: ANESTHESIOLOGY

## 2023-11-23 PROCEDURE — 6360000002 HC RX W HCPCS: Performed by: OBSTETRICS & GYNECOLOGY

## 2023-11-23 PROCEDURE — 83735 ASSAY OF MAGNESIUM: CPT

## 2023-11-23 PROCEDURE — 36415 COLL VENOUS BLD VENIPUNCTURE: CPT

## 2023-11-23 RX ORDER — ONDANSETRON 4 MG/1
4 TABLET, ORALLY DISINTEGRATING ORAL EVERY 8 HOURS PRN
Status: DISCONTINUED | OUTPATIENT
Start: 2023-11-23 | End: 2023-11-25 | Stop reason: HOSPADM

## 2023-11-23 RX ORDER — ONDANSETRON 2 MG/ML
4 INJECTION INTRAMUSCULAR; INTRAVENOUS EVERY 6 HOURS PRN
Status: DISCONTINUED | OUTPATIENT
Start: 2023-11-23 | End: 2023-11-25 | Stop reason: HOSPADM

## 2023-11-23 RX ORDER — PRENATAL VIT/IRON FUM/FOLIC AC 27MG-0.8MG
1 TABLET ORAL DAILY
Status: DISCONTINUED | OUTPATIENT
Start: 2023-11-23 | End: 2023-11-25 | Stop reason: HOSPADM

## 2023-11-23 RX ORDER — SODIUM CHLORIDE 0.9 % (FLUSH) 0.9 %
5-40 SYRINGE (ML) INJECTION EVERY 12 HOURS SCHEDULED
Status: DISCONTINUED | OUTPATIENT
Start: 2023-11-23 | End: 2023-11-25 | Stop reason: HOSPADM

## 2023-11-23 RX ORDER — FUROSEMIDE 20 MG/1
20 TABLET ORAL DAILY
Status: DISCONTINUED | OUTPATIENT
Start: 2023-11-23 | End: 2023-11-25 | Stop reason: HOSPADM

## 2023-11-23 RX ORDER — MORPHINE SULFATE 2 MG/ML
2 INJECTION, SOLUTION INTRAMUSCULAR; INTRAVENOUS
Status: DISCONTINUED | OUTPATIENT
Start: 2023-11-23 | End: 2023-11-25 | Stop reason: HOSPADM

## 2023-11-23 RX ORDER — ACETAMINOPHEN 500 MG
1000 TABLET ORAL EVERY 6 HOURS
Status: DISCONTINUED | OUTPATIENT
Start: 2023-11-23 | End: 2023-11-25 | Stop reason: HOSPADM

## 2023-11-23 RX ORDER — TRANEXAMIC ACID 10 MG/ML
1000 INJECTION, SOLUTION INTRAVENOUS
Status: ACTIVE | OUTPATIENT
Start: 2023-11-23 | End: 2023-11-24

## 2023-11-23 RX ORDER — SIMETHICONE 80 MG
80 TABLET,CHEWABLE ORAL EVERY 6 HOURS PRN
Status: DISCONTINUED | OUTPATIENT
Start: 2023-11-23 | End: 2023-11-25 | Stop reason: HOSPADM

## 2023-11-23 RX ORDER — SODIUM CHLORIDE 0.9 % (FLUSH) 0.9 %
5-40 SYRINGE (ML) INJECTION PRN
Status: DISCONTINUED | OUTPATIENT
Start: 2023-11-23 | End: 2023-11-25 | Stop reason: HOSPADM

## 2023-11-23 RX ORDER — SODIUM CHLORIDE, SODIUM LACTATE, POTASSIUM CHLORIDE, CALCIUM CHLORIDE 600; 310; 30; 20 MG/100ML; MG/100ML; MG/100ML; MG/100ML
INJECTION, SOLUTION INTRAVENOUS CONTINUOUS
Status: DISCONTINUED | OUTPATIENT
Start: 2023-11-23 | End: 2023-11-25 | Stop reason: HOSPADM

## 2023-11-23 RX ORDER — MORPHINE SULFATE 4 MG/ML
4 INJECTION, SOLUTION INTRAMUSCULAR; INTRAVENOUS
Status: DISCONTINUED | OUTPATIENT
Start: 2023-11-23 | End: 2023-11-25 | Stop reason: HOSPADM

## 2023-11-23 RX ORDER — OXYCODONE HYDROCHLORIDE 5 MG/1
10 TABLET ORAL EVERY 4 HOURS PRN
Status: DISCONTINUED | OUTPATIENT
Start: 2023-11-23 | End: 2023-11-25 | Stop reason: HOSPADM

## 2023-11-23 RX ORDER — LANOLIN
CREAM (ML) TOPICAL
Status: DISCONTINUED | OUTPATIENT
Start: 2023-11-23 | End: 2023-11-25 | Stop reason: HOSPADM

## 2023-11-23 RX ORDER — DOCUSATE SODIUM 100 MG/1
100 CAPSULE, LIQUID FILLED ORAL 2 TIMES DAILY
Status: DISCONTINUED | OUTPATIENT
Start: 2023-11-23 | End: 2023-11-25 | Stop reason: HOSPADM

## 2023-11-23 RX ORDER — OXYCODONE HYDROCHLORIDE 5 MG/1
5 TABLET ORAL EVERY 4 HOURS PRN
Status: DISCONTINUED | OUTPATIENT
Start: 2023-11-23 | End: 2023-11-25 | Stop reason: HOSPADM

## 2023-11-23 RX ORDER — FAMOTIDINE 20 MG/1
20 TABLET, FILM COATED ORAL 2 TIMES DAILY PRN
Status: DISCONTINUED | OUTPATIENT
Start: 2023-11-23 | End: 2023-11-25 | Stop reason: HOSPADM

## 2023-11-23 RX ADMIN — DOCUSATE SODIUM 100 MG: 100 CAPSULE, LIQUID FILLED ORAL at 20:52

## 2023-11-23 RX ADMIN — HYDROMORPHONE HYDROCHLORIDE 0.5 MG: 1 INJECTION, SOLUTION INTRAMUSCULAR; INTRAVENOUS; SUBCUTANEOUS at 06:56

## 2023-11-23 RX ADMIN — NIFEDIPINE 30 MG: 30 TABLET, EXTENDED RELEASE ORAL at 08:46

## 2023-11-23 RX ADMIN — SIMETHICONE 80 MG: 80 TABLET, CHEWABLE ORAL at 16:13

## 2023-11-23 RX ADMIN — ACETAMINOPHEN 1000 MG: 325 TABLET, FILM COATED ORAL at 08:45

## 2023-11-23 RX ADMIN — MAGNESIUM SULFATE 2000 MG/HR: 500 INJECTION, SOLUTION INTRAMUSCULAR; INTRAVENOUS at 06:24

## 2023-11-23 RX ADMIN — SIMETHICONE 80 MG: 80 TABLET, CHEWABLE ORAL at 20:52

## 2023-11-23 RX ADMIN — PRENATAL VIT W/ FE FUMARATE-FA TAB 27-0.8 MG 1 TABLET: 27-0.8 TAB at 09:49

## 2023-11-23 RX ADMIN — OXYCODONE HYDROCHLORIDE 5 MG: 5 TABLET ORAL at 13:37

## 2023-11-23 RX ADMIN — SIMETHICONE 80 MG: 80 TABLET, CHEWABLE ORAL at 10:45

## 2023-11-23 RX ADMIN — ACETAMINOPHEN 1000 MG: 325 TABLET, FILM COATED ORAL at 20:52

## 2023-11-23 RX ADMIN — ACETAMINOPHEN 1000 MG: 325 TABLET, FILM COATED ORAL at 13:37

## 2023-11-23 RX ADMIN — SODIUM CHLORIDE, POTASSIUM CHLORIDE, SODIUM LACTATE AND CALCIUM CHLORIDE: 600; 310; 30; 20 INJECTION, SOLUTION INTRAVENOUS at 07:45

## 2023-11-23 RX ADMIN — FUROSEMIDE 20 MG: 20 TABLET ORAL at 09:50

## 2023-11-23 RX ADMIN — ACETAMINOPHEN 650 MG: 325 TABLET, FILM COATED ORAL at 02:30

## 2023-11-23 ASSESSMENT — PAIN - FUNCTIONAL ASSESSMENT: PAIN_FUNCTIONAL_ASSESSMENT: ACTIVITIES ARE NOT PREVENTED

## 2023-11-23 ASSESSMENT — PAIN DESCRIPTION - ORIENTATION: ORIENTATION: MID

## 2023-11-23 ASSESSMENT — PAIN DESCRIPTION - DESCRIPTORS: DESCRIPTORS: ACHING

## 2023-11-23 ASSESSMENT — PAIN SCALES - GENERAL
PAINLEVEL_OUTOF10: 5
PAINLEVEL_OUTOF10: 7

## 2023-11-23 ASSESSMENT — PAIN DESCRIPTION - LOCATION: LOCATION: ABDOMEN

## 2023-11-23 NOTE — PLAN OF CARE
Assessment done and pt maximus well, discussed poc and pt agreeable, has no needs, will continue to monitor and adjust poc prn

## 2023-11-23 NOTE — ANESTHESIA PROCEDURE NOTES
Peripheral Block    Patient location during procedure: OR  Reason for block: post-op pain management and at surgeon's request  Start time: 11/22/2023 6:14 PM  End time: 11/22/2023 6:22 PM  Staffing  Performed: anesthesiologist   Anesthesiologist: Karen Horton MD  Performed by: Karen Horton MD  Authorized by: Karen Horton MD    Preanesthetic Checklist  Completed: patient identified, IV checked, site marked, risks and benefits discussed, surgical/procedural consents, equipment checked, pre-op evaluation, timeout performed, anesthesia consent given, oxygen available and monitors applied/VS acknowledged  Peripheral Block   Patient position: supine  Prep: ChloraPrep  Provider prep: mask  Patient monitoring: cardiac monitor, continuous pulse ox, frequent blood pressure checks, IV access, oxygen and continuous capnometry  Block type: TAP  Laterality: bilateral  Injection technique: single-shot  Guidance: ultrasound guided    Needle   Needle gauge: 20 G  Needle localization: ultrasound guidance  Needle length: 10 cm  Assessment   Injection assessment: negative aspiration for heme, no paresthesia on injection and no intravascular symptoms  Slow fractionated injection: yes  Hemodynamics: stable  Real-time US image taken/store: yes  Outcomes: uncomplicated and patient tolerated procedure well    Additional Notes  Each side 15ml 0.5% Ropiv and 5mg decadron diluted to 30ml with NS.   Medications Administered  dexamethasone (DECADRON) (PF) 10 mg/mL injection - Other   10 mg - 11/22/2023 6:14:00 PM  ropivacaine (NAROPIN) injection 0.5% - Perineural   30 mL - 11/22/2023 6:14:00 PM
General

## 2023-11-23 NOTE — LACTATION NOTE

## 2023-11-23 NOTE — LACTATION NOTE
This note was copied from a baby's chart. Lactation visit. 3rd time mom,  last child x 2 years, weaned during this pregnancy. This baby 36 week infant, mom on magnesium. Did bottle feed mostly overnight due to maternal condition but has latched and fed well on the breast today per mom and RN report. Stable blood glucose. Baby had recently fed per mom report so not observed at this visit. Mom trying to rest but having some pain issues. Short visit. Reviewed feeding needs. Feed every 3 hours. Breastfeed if able, if not baby will need to be supplemented since he is 36 weeks LPI. Mom agreeable. LC to follow up tomorrow.

## 2023-11-24 LAB
ALBUMIN SERPL-MCNC: 2.2 G/DL (ref 3.5–5)
ALBUMIN/GLOB SERPL: 0.5 (ref 0.4–1.6)
ALP SERPL-CCNC: 136 U/L (ref 50–136)
ALT SERPL-CCNC: 80 U/L (ref 12–65)
ANION GAP SERPL CALC-SCNC: 6 MMOL/L (ref 2–11)
AST SERPL-CCNC: 75 U/L (ref 15–37)
BASOPHILS # BLD: 0 K/UL (ref 0–0.2)
BASOPHILS NFR BLD: 0 % (ref 0–2)
BILIRUB SERPL-MCNC: 0.3 MG/DL (ref 0.2–1.1)
BUN SERPL-MCNC: 7 MG/DL (ref 6–23)
CALCIUM SERPL-MCNC: 7.8 MG/DL (ref 8.3–10.4)
CHLORIDE SERPL-SCNC: 109 MMOL/L (ref 101–110)
CO2 SERPL-SCNC: 25 MMOL/L (ref 21–32)
CREAT SERPL-MCNC: 0.7 MG/DL (ref 0.6–1)
DIFFERENTIAL METHOD BLD: ABNORMAL
EOSINOPHIL # BLD: 0 K/UL (ref 0–0.8)
EOSINOPHIL NFR BLD: 0 % (ref 0.5–7.8)
ERYTHROCYTE [DISTWIDTH] IN BLOOD BY AUTOMATED COUNT: 15.2 % (ref 11.9–14.6)
GLOBULIN SER CALC-MCNC: 4.1 G/DL (ref 2.8–4.5)
GLUCOSE SERPL-MCNC: 79 MG/DL (ref 65–100)
HCT VFR BLD AUTO: 33 % (ref 35.8–46.3)
HGB BLD-MCNC: 10.2 G/DL (ref 11.7–15.4)
IMM GRANULOCYTES # BLD AUTO: 0.1 K/UL (ref 0–0.5)
IMM GRANULOCYTES NFR BLD AUTO: 1 % (ref 0–5)
LYMPHOCYTES # BLD: 1.7 K/UL (ref 0.5–4.6)
LYMPHOCYTES NFR BLD: 18 % (ref 13–44)
MCH RBC QN AUTO: 24.1 PG (ref 26.1–32.9)
MCHC RBC AUTO-ENTMCNC: 30.9 G/DL (ref 31.4–35)
MCV RBC AUTO: 78 FL (ref 82–102)
MONOCYTES # BLD: 0.7 K/UL (ref 0.1–1.3)
MONOCYTES NFR BLD: 7 % (ref 4–12)
NEUTS SEG # BLD: 7.2 K/UL (ref 1.7–8.2)
NEUTS SEG NFR BLD: 74 % (ref 43–78)
NRBC # BLD: 0.02 K/UL (ref 0–0.2)
PLATELET # BLD AUTO: 80 K/UL (ref 150–450)
PLATELET COMMENT: ABNORMAL
PMV BLD AUTO: ABNORMAL FL (ref 9.4–12.3)
POTASSIUM SERPL-SCNC: 3.9 MMOL/L (ref 3.5–5.1)
PROT SERPL-MCNC: 6.3 G/DL (ref 6.3–8.2)
RBC # BLD AUTO: 4.23 M/UL (ref 4.05–5.2)
RBC MORPH BLD: ABNORMAL
SODIUM SERPL-SCNC: 140 MMOL/L (ref 133–143)
WBC # BLD AUTO: 9.7 K/UL (ref 4.3–11.1)
WBC MORPH BLD: ABNORMAL

## 2023-11-24 PROCEDURE — 6370000000 HC RX 637 (ALT 250 FOR IP): Performed by: OBSTETRICS & GYNECOLOGY

## 2023-11-24 PROCEDURE — 85025 COMPLETE CBC W/AUTO DIFF WBC: CPT

## 2023-11-24 PROCEDURE — 80053 COMPREHEN METABOLIC PANEL: CPT

## 2023-11-24 PROCEDURE — 2580000003 HC RX 258: Performed by: OBSTETRICS & GYNECOLOGY

## 2023-11-24 PROCEDURE — 1100000000 HC RM PRIVATE

## 2023-11-24 PROCEDURE — 36415 COLL VENOUS BLD VENIPUNCTURE: CPT

## 2023-11-24 RX ADMIN — NIFEDIPINE 30 MG: 30 TABLET, EXTENDED RELEASE ORAL at 09:53

## 2023-11-24 RX ADMIN — OXYCODONE HYDROCHLORIDE 5 MG: 5 TABLET ORAL at 14:35

## 2023-11-24 RX ADMIN — SIMETHICONE 80 MG: 80 TABLET, CHEWABLE ORAL at 09:53

## 2023-11-24 RX ADMIN — PRENATAL VIT W/ FE FUMARATE-FA TAB 27-0.8 MG 1 TABLET: 27-0.8 TAB at 09:54

## 2023-11-24 RX ADMIN — ACETAMINOPHEN 1000 MG: 325 TABLET, FILM COATED ORAL at 22:05

## 2023-11-24 RX ADMIN — OXYCODONE 10 MG: 5 TABLET ORAL at 00:42

## 2023-11-24 RX ADMIN — ACETAMINOPHEN 1000 MG: 325 TABLET, FILM COATED ORAL at 16:05

## 2023-11-24 RX ADMIN — DOCUSATE SODIUM 100 MG: 100 CAPSULE, LIQUID FILLED ORAL at 22:06

## 2023-11-24 RX ADMIN — ACETAMINOPHEN 1000 MG: 325 TABLET, FILM COATED ORAL at 04:18

## 2023-11-24 RX ADMIN — SODIUM CHLORIDE, PRESERVATIVE FREE 10 ML: 5 INJECTION INTRAVENOUS at 04:21

## 2023-11-24 RX ADMIN — SIMETHICONE 80 MG: 80 TABLET, CHEWABLE ORAL at 16:05

## 2023-11-24 RX ADMIN — FUROSEMIDE 20 MG: 20 TABLET ORAL at 09:53

## 2023-11-24 RX ADMIN — DOCUSATE SODIUM 100 MG: 100 CAPSULE, LIQUID FILLED ORAL at 09:53

## 2023-11-24 RX ADMIN — ACETAMINOPHEN 1000 MG: 325 TABLET, FILM COATED ORAL at 10:18

## 2023-11-24 ASSESSMENT — PAIN DESCRIPTION - DESCRIPTORS: DESCRIPTORS: PRESSURE

## 2023-11-24 ASSESSMENT — PAIN DESCRIPTION - ORIENTATION: ORIENTATION: ANTERIOR

## 2023-11-24 ASSESSMENT — PAIN SCALES - GENERAL
PAINLEVEL_OUTOF10: 7
PAINLEVEL_OUTOF10: 5

## 2023-11-24 ASSESSMENT — PAIN DESCRIPTION - LOCATION: LOCATION: ABDOMEN

## 2023-11-24 NOTE — PROGRESS NOTES
Safety Teaching reviewed:   Hand hygiene prior to handling the infant. Use of bulb syringe  Bracelets with matching numbers are placed on mother and infant  An infant security tag  (Hugs) is placed on the infant's ankle and monitored  All OB nurses wear pink Employee badges - do not give your baby to anyone without proper identification. Never leave the baby alone in the room. The infant should be placed on their back to sleep. on a firm mattress. No toys should be placed in the crib. (safe sleep video offered to view)  Never shake the baby (video offered to view)  Infant fall prevention - do not sleep with the baby, and place the baby in the crib while ambulating. Mother and Baby Care booklet given to Mother. Bulb syringe at bedside. Patient up to bathroom with RN assistance. Isabel-care taught and completed. Questions encouraged and answered. Patient ambulating without difficulty, encouraged to call for needs or concerns. Verbalizes understanding.

## 2023-11-24 NOTE — PLAN OF CARE
Problem: Postpartum  Goal: Experiences normal postpartum course  Description:  Postpartum OB-Pregnancy care plan goal which identifies if the mother is experiencing a normal postpartum course  Outcome: Progressing  Goal: Appropriate maternal -  bonding  Description:  Postpartum OB-Pregnancy care plan goal which identifies if the mother and  are bonding appropriately  Outcome: Progressing  Goal: Establishment of infant feeding pattern  Description:  Postpartum OB-Pregnancy care plan goal which identifies if the mother is establishing a feeding pattern with their   Outcome: Progressing  Goal: Incisions, wounds, or drain sites healing without S/S of infection  Outcome: Progressing     Problem: Pain  Goal: Verbalizes/displays adequate comfort level or baseline comfort level  2023 by Ligia Yu RN  Outcome: Progressing  2023 by Ja Rosas RN  Outcome: Progressing     Problem: Infection - Adult  Goal: Absence of infection during hospitalization  Outcome: Progressing     Problem: Safety - Adult  Goal: Free from fall injury  Outcome: Progressing

## 2023-11-24 NOTE — LACTATION NOTE
This note was copied from a baby's chart. In to see mom and infant for follow up. Siblings in room about to meet baby for first time and other extended family. Mom states she feels baby is latching and feeding well, has kept good energy today despite being LPT. Mom has no needs at this time.  Aware can pump after feeds if needed if baby starts to get sleepy at breast. Lactation to follow up in am.

## 2023-11-25 VITALS
DIASTOLIC BLOOD PRESSURE: 87 MMHG | RESPIRATION RATE: 16 BRPM | OXYGEN SATURATION: 97 % | TEMPERATURE: 98.3 F | HEART RATE: 65 BPM | SYSTOLIC BLOOD PRESSURE: 137 MMHG

## 2023-11-25 PROBLEM — G89.18 POST-OP PAIN: Status: ACTIVE | Noted: 2023-11-25

## 2023-11-25 LAB
ABO + RH BLD: NORMAL
ALBUMIN SERPL-MCNC: 2.2 G/DL (ref 3.5–5)
ALBUMIN/GLOB SERPL: 0.5 (ref 0.4–1.6)
ALP SERPL-CCNC: 128 U/L (ref 50–136)
ALT SERPL-CCNC: 58 U/L (ref 12–65)
ANION GAP SERPL CALC-SCNC: 7 MMOL/L (ref 2–11)
AST SERPL-CCNC: 51 U/L (ref 15–37)
BASOPHILS # BLD: 0 K/UL (ref 0–0.2)
BASOPHILS NFR BLD: 0 % (ref 0–2)
BILIRUB SERPL-MCNC: 0.3 MG/DL (ref 0.2–1.1)
BLD PROD TYP BPU: NORMAL
BLD PROD TYP BPU: NORMAL
BLOOD BANK DISPENSE STATUS: NORMAL
BLOOD BANK DISPENSE STATUS: NORMAL
BLOOD GROUP ANTIBODIES SERPL: NORMAL
BPU ID: NORMAL
BPU ID: NORMAL
BUN SERPL-MCNC: 7 MG/DL (ref 6–23)
CALCIUM SERPL-MCNC: 8.6 MG/DL (ref 8.3–10.4)
CHLORIDE SERPL-SCNC: 108 MMOL/L (ref 101–110)
CO2 SERPL-SCNC: 25 MMOL/L (ref 21–32)
CREAT SERPL-MCNC: 0.64 MG/DL (ref 0.6–1)
CROSSMATCH RESULT: NORMAL
CROSSMATCH RESULT: NORMAL
DIFFERENTIAL METHOD BLD: ABNORMAL
EOSINOPHIL # BLD: 0.1 K/UL (ref 0–0.8)
EOSINOPHIL NFR BLD: 1 % (ref 0.5–7.8)
ERYTHROCYTE [DISTWIDTH] IN BLOOD BY AUTOMATED COUNT: 15.1 % (ref 11.9–14.6)
GLOBULIN SER CALC-MCNC: 4.2 G/DL (ref 2.8–4.5)
GLUCOSE SERPL-MCNC: 80 MG/DL (ref 65–100)
HCT VFR BLD AUTO: 33.7 % (ref 35.8–46.3)
HGB BLD-MCNC: 10.5 G/DL (ref 11.7–15.4)
IMM GRANULOCYTES # BLD AUTO: 0.1 K/UL (ref 0–0.5)
IMM GRANULOCYTES NFR BLD AUTO: 1 % (ref 0–5)
LYMPHOCYTES # BLD: 2.2 K/UL (ref 0.5–4.6)
LYMPHOCYTES NFR BLD: 25 % (ref 13–44)
MCH RBC QN AUTO: 24.4 PG (ref 26.1–32.9)
MCHC RBC AUTO-ENTMCNC: 31.2 G/DL (ref 31.4–35)
MCV RBC AUTO: 78.2 FL (ref 82–102)
MONOCYTES # BLD: 0.6 K/UL (ref 0.1–1.3)
MONOCYTES NFR BLD: 7 % (ref 4–12)
NEUTS SEG # BLD: 5.6 K/UL (ref 1.7–8.2)
NEUTS SEG NFR BLD: 66 % (ref 43–78)
NRBC # BLD: 0 K/UL (ref 0–0.2)
PLATELET # BLD AUTO: 92 K/UL (ref 150–450)
PLATELET COMMENT: SLIGHT
PMV BLD AUTO: ABNORMAL FL (ref 9.4–12.3)
POTASSIUM SERPL-SCNC: 4 MMOL/L (ref 3.5–5.1)
PROT SERPL-MCNC: 6.4 G/DL (ref 6.3–8.2)
RBC # BLD AUTO: 4.31 M/UL (ref 4.05–5.2)
RBC MORPH BLD: ABNORMAL
SODIUM SERPL-SCNC: 140 MMOL/L (ref 133–143)
SPECIMEN EXP DATE BLD: NORMAL
UNIT DIVISION: 0
UNIT DIVISION: 0
WBC # BLD AUTO: 8.6 K/UL (ref 4.3–11.1)
WBC MORPH BLD: ABNORMAL

## 2023-11-25 PROCEDURE — 85025 COMPLETE CBC W/AUTO DIFF WBC: CPT

## 2023-11-25 PROCEDURE — 36415 COLL VENOUS BLD VENIPUNCTURE: CPT

## 2023-11-25 PROCEDURE — 6370000000 HC RX 637 (ALT 250 FOR IP): Performed by: OBSTETRICS & GYNECOLOGY

## 2023-11-25 PROCEDURE — 80053 COMPREHEN METABOLIC PANEL: CPT

## 2023-11-25 RX ORDER — FUROSEMIDE 20 MG/1
20 TABLET ORAL DAILY
Qty: 7 TABLET | Refills: 0 | Status: SHIPPED | OUTPATIENT
Start: 2023-11-25 | End: 2023-12-02

## 2023-11-25 RX ORDER — NIFEDIPINE 30 MG/1
30 TABLET, EXTENDED RELEASE ORAL DAILY
Qty: 30 TABLET | Refills: 3 | Status: SHIPPED | OUTPATIENT
Start: 2023-11-25

## 2023-11-25 RX ORDER — OXYCODONE HYDROCHLORIDE 5 MG/1
5 TABLET ORAL EVERY 4 HOURS PRN
Qty: 30 TABLET | Refills: 0 | Status: SHIPPED | OUTPATIENT
Start: 2023-11-25 | End: 2023-12-09

## 2023-11-25 RX ADMIN — NIFEDIPINE 30 MG: 30 TABLET, EXTENDED RELEASE ORAL at 09:29

## 2023-11-25 RX ADMIN — PRENATAL VIT W/ FE FUMARATE-FA TAB 27-0.8 MG 1 TABLET: 27-0.8 TAB at 09:29

## 2023-11-25 RX ADMIN — OXYCODONE HYDROCHLORIDE 5 MG: 5 TABLET ORAL at 00:37

## 2023-11-25 RX ADMIN — DOCUSATE SODIUM 100 MG: 100 CAPSULE, LIQUID FILLED ORAL at 09:29

## 2023-11-25 RX ADMIN — ACETAMINOPHEN 1000 MG: 325 TABLET, FILM COATED ORAL at 10:38

## 2023-11-25 RX ADMIN — ACETAMINOPHEN 1000 MG: 325 TABLET, FILM COATED ORAL at 04:55

## 2023-11-25 RX ADMIN — FUROSEMIDE 20 MG: 20 TABLET ORAL at 09:29

## 2023-11-25 ASSESSMENT — PAIN DESCRIPTION - ORIENTATION: ORIENTATION: ANTERIOR

## 2023-11-25 ASSESSMENT — PAIN SCALES - GENERAL: PAINLEVEL_OUTOF10: 6

## 2023-11-25 ASSESSMENT — PAIN DESCRIPTION - LOCATION: LOCATION: ABDOMEN

## 2023-11-25 ASSESSMENT — PAIN DESCRIPTION - DESCRIPTORS: DESCRIPTORS: SORE;PRESSURE

## 2023-11-25 NOTE — LACTATION NOTE
This note was copied from a baby's chart. Mom and baby are going home today. Continue to offer the breast without restriction. Mom's milk should be fully in over the next few days. Reviewed engorgement precautions. Hand Expression has been demoed and written hand-out reviewed. As milk comes in baby will be more alert at the breast and swallows will be more obvious. Breasts may feel softer once baby has finished nursing. Baby should be back to birth weight by 3weeks of age. And then gain on average 1 oz per day for the next 2-3 months. Reviewed babies should be exclusively breastfeeding for the first 6 months and that breastfeeding should continue after introduction of appropriate complimentary foods after 6 months. Initial output should be at least 1 wet and 1 bowel movement for each day old baby is. By day 5-7 once milk is fully in baby will consistently have 6 or more soaking wet diapers and about 4 bowel movement. Some babies have a bowel movement with every feeding and some have 1-3 large bowel movements each day. Inadequate output may indicate inadequate feedings and should be reported to your Pediatrician. Bowel habits may change as baby gets older. Encouraged follow-up at Pediatrician in 1-2 days, no later than 1 week of age. Call [facility] for any questions as needed or to set up an OP visit. OP phone calls are returned within 24 hours. Community Breastfeeding Resource List given. LPI. Latching well at all feeds. Cluster feeding. Milk in per mom and RN. Doing great. Good feeds, copious output, did gain weight overnight also. Mom confident. Keep feeding on demand. Watch output.

## 2023-11-25 NOTE — DISCHARGE INSTRUCTIONS
Discharge instruction to follow: Activity: Pelvis rest for 6 weeks     No heavy lifting over 15 lbs for 2 weeks     No driving for 2 weeks     No push/pull motion such as sweeping or vacuuming for 2 weeks     No tub baths for 6 weeks     section keep incision clean and dry, may shower as normal with soap and water. Inspect incision every day for signs of infection listed below. Continue to use hien-bottle with every void or bowel movement until comfortable stopping. Change sanitary pad after each urination or bowel movement. Call MD for the following:      Fever over 101 F; pain not relieved by medication; foul smelling vaginal discharge or increase in vaginal bleeding. Redness, swelling, or drainage from  incision. Take medication as prescribed. Follow up with MD as order.  Section: What to Expect at [address]     A  section, or , is surgery to deliver your baby through a cut that the doctor makes in your lower belly and uterus. The cut is called an incision. You may have some pain in your lower belly and need pain medicine for 1 to 2 weeks. You can expect some vaginal bleeding for several weeks. You will probably need about 6 weeks to fully recover. It's important to take it easy while the incision heals. Avoid heavy lifting, strenuous activities, and exercises that strain the belly muscles while you recover. Ask a family member or friend for help with housework, cooking, and shopping. This care sheet gives you a general idea about how long it will take for you to recover. But each person recovers at a different pace. Follow the steps below to get better as quickly as possible. How can you care for yourself at home? Activity    Rest when you feel tired. Getting enough sleep will help you recover. Try to walk each day. Start by walking a little more than you did the day before. Bit by bit, increase the amount you walk.  Walking boosts blood one drink, and limit the number of occasions that you have a drink. Wait to breastfeed at least 2 hours after you have a drink to reduce the amount of alcohol the baby may get in the milk. Medicines    Your doctor will tell you if and when you can restart your medicines. You will also get instructions about taking any new medicines. If you stopped taking aspirin or some other blood thinner, your doctor will tell you when to start taking it again. Take pain medicines exactly as directed. If the doctor gave you a prescription medicine for pain, take it as prescribed. If you are not taking a prescription pain medicine, ask your doctor if you can take an over-the-counter medicine. If you think your pain medicine is making you sick to your stomach: Take your medicine after meals (unless your doctor has told you not to). Ask your doctor for a different pain medicine. If your doctor prescribed antibiotics, take them as directed. Do not stop taking them just because you feel better. You need to take the full course of antibiotics. Incision care    If you have strips of tape on the incision, leave the tape on for a week or until it falls off. Wash the area daily with warm, soapy water, and pat it dry. Don't use hydrogen peroxide or alcohol, which can slow healing. You may cover the area with a gauze bandage if it weeps or rubs against clothing. Change the bandage every day. Keep the area clean and dry. Other instructions    If you breastfeed your baby, you may be more comfortable while you are healing if you don't rest your baby on your belly. Try tucking your baby under your arm, with your baby's body along the side you will be feeding on. Support your baby's upper body with your arm. With that hand you can control your baby's head to bring your baby's mouth to your breast. This is sometimes called the football hold. Follow-up care is a key part of your treatment and safety.  Be sure to

## 2023-11-25 NOTE — PROGRESS NOTES
Teaching for self care reviewed. Discharge instructions reviewed and signed. Copy given to pt. Prescriptions reviewed. Reviewed follow up appointment for self at 5 days, 2 weeks, and 6 weeks. Questions encouraged and answered. Identification verified with mom and infant bands and signed. Instructed to call when ready for discharge.

## 2023-11-25 NOTE — PLAN OF CARE
Problem: Postpartum  Goal: Experiences normal postpartum course  Description:  Postpartum OB-Pregnancy care plan goal which identifies if the mother is experiencing a normal postpartum course  Outcome: Progressing  Goal: Appropriate maternal -  bonding  Description:  Postpartum OB-Pregnancy care plan goal which identifies if the mother and  are bonding appropriately  Outcome: Progressing  Goal: Establishment of infant feeding pattern  Description:  Postpartum OB-Pregnancy care plan goal which identifies if the mother is establishing a feeding pattern with their   Outcome: Progressing  Goal: Incisions, wounds, or drain sites healing without S/S of infection  Outcome: Progressing  Flowsheets (Taken 2023)  Incisions, Wounds, or Drain Sites Healing Without Sign and Symptoms of Infection: TWICE DAILY: Assess and document skin integrity     Problem: Infection - Adult  Goal: Absence of infection during hospitalization  Outcome: Progressing     Problem: Safety - Adult  Goal: Free from fall injury  Outcome: Progressing

## 2023-11-25 NOTE — DISCHARGE SUMMARY
Patient ID:  Steven Cameron  [id number]  74 y.o.  1995    Admit Date: 2023    Discharge Date: 2023     Admitting Physician: Jennifer Brewer MD     Admission Diagnoses: Gestational hypertension without significant proteinuria during pregnancy in third trimester, antepartum [O13.3]  Gestational htn w/o significant proteinuria, third trimester [O13.3]  HELLP syndrome (HELLP), third trimester [O14.23]  Maternal care for uterine scar due to previous  section, delivered [O34.219]    Discharge Diagnoses: Same as above with   at   on   by   producing a viable  . Information for the patient's :  Abel Colón [[id number]]          Additional Diagnoses: none. No components found for: \"OBEXTABO/RH\", \"OBEXTABSCRN\", \"OBEXTHBSAG\", \"OBEXTHIV\", \"OBEXTRUBELLA\", \"OBEXTRPR\", \"OBEXTGONORR\", \"OBEXTCHLAM\", \"OBEXTGRBS\"    Significant Diagnostic Studies: none            History of Present Illness:   OB History          5    Para   3    Term   2       1    AB   2    Living   3         SAB        IAB        Ectopic        Molar        Multiple   0    Live Births   3              Admitted for Low platelets. .  Pregnancy complicated by ITP, thn P/E. Hospital Course:   Patient was admitted for P/E with low Platelets, had been on steroids, hematology added IVIG, yet plts remained low. Decision with increasing BP, to proceed with c/s. Given plts. General anesthesia. The remainder of the postpartum course was BP control with Procardia, 24 hours of Mag. And Lasix. LFT and Plts improved daily till time of discharge, was ;ots 92K, ALT- 58 was 80,and AST-51 was 75  She was deemed stable for discharge home on day 3. Patient Instructions:   Current Discharge Medication List        START taking these medications    Details   oxyCODONE (ROXICODONE) 5 MG immediate release tablet Take 1 tablet by mouth every 4 hours as needed for Pain for up to 14 days.  Max Daily Amount: 30 mg  Qty: 30

## 2024-01-22 ENCOUNTER — TELEPHONE (OUTPATIENT)
Dept: ONCOLOGY | Age: 29
End: 2024-01-22

## 2024-03-28 ENCOUNTER — OFFICE VISIT (OUTPATIENT)
Dept: ONCOLOGY | Age: 29
End: 2024-03-28
Payer: COMMERCIAL

## 2024-03-28 ENCOUNTER — HOSPITAL ENCOUNTER (OUTPATIENT)
Dept: LAB | Age: 29
Discharge: HOME | End: 2024-03-28
Payer: COMMERCIAL

## 2024-03-28 VITALS
SYSTOLIC BLOOD PRESSURE: 123 MMHG | OXYGEN SATURATION: 100 % | WEIGHT: 158.8 LBS | HEART RATE: 65 BPM | DIASTOLIC BLOOD PRESSURE: 75 MMHG | HEIGHT: 62 IN | TEMPERATURE: 97.9 F | BODY MASS INDEX: 29.22 KG/M2 | RESPIRATION RATE: 18 BRPM

## 2024-03-28 DIAGNOSIS — D69.6 THROMBOCYTOPENIA COMPLICATING PREGNANCY (HCC): ICD-10-CM

## 2024-03-28 DIAGNOSIS — Z86.2 HISTORY OF ITP: Primary | ICD-10-CM

## 2024-03-28 DIAGNOSIS — O99.119 THROMBOCYTOPENIA COMPLICATING PREGNANCY (HCC): ICD-10-CM

## 2024-03-28 LAB
ALBUMIN SERPL-MCNC: 3.8 G/DL (ref 3.5–5)
ALBUMIN/GLOB SERPL: 1.1 (ref 0.4–1.6)
ALP SERPL-CCNC: 73 U/L (ref 50–136)
ALT SERPL-CCNC: 24 U/L (ref 12–65)
ANION GAP SERPL CALC-SCNC: 2 MMOL/L (ref 2–11)
AST SERPL-CCNC: 23 U/L (ref 15–37)
BASOPHILS # BLD: 0 K/UL (ref 0–0.2)
BASOPHILS NFR BLD: 1 % (ref 0–2)
BILIRUB SERPL-MCNC: 0.4 MG/DL (ref 0.2–1.1)
BUN SERPL-MCNC: 11 MG/DL (ref 6–23)
CALCIUM SERPL-MCNC: 9 MG/DL (ref 8.3–10.4)
CHLORIDE SERPL-SCNC: 110 MMOL/L (ref 103–113)
CO2 SERPL-SCNC: 29 MMOL/L (ref 21–32)
CREAT SERPL-MCNC: 0.9 MG/DL (ref 0.6–1)
DIFFERENTIAL METHOD BLD: ABNORMAL
EOSINOPHIL # BLD: 0.1 K/UL (ref 0–0.8)
EOSINOPHIL NFR BLD: 1 % (ref 0.5–7.8)
ERYTHROCYTE [DISTWIDTH] IN BLOOD BY AUTOMATED COUNT: 14 % (ref 11.9–14.6)
GLOBULIN SER CALC-MCNC: 3.5 G/DL (ref 2.8–4.5)
GLUCOSE SERPL-MCNC: 122 MG/DL (ref 65–100)
HCT VFR BLD AUTO: 37 % (ref 35.8–46.3)
HGB BLD-MCNC: 11.2 G/DL (ref 11.7–15.4)
IMM GRANULOCYTES # BLD AUTO: 0 K/UL (ref 0–0.5)
IMM GRANULOCYTES NFR BLD AUTO: 0 % (ref 0–5)
LYMPHOCYTES # BLD: 2.1 K/UL (ref 0.5–4.6)
LYMPHOCYTES NFR BLD: 43 % (ref 13–44)
MCH RBC QN AUTO: 23.2 PG (ref 26.1–32.9)
MCHC RBC AUTO-ENTMCNC: 30.3 G/DL (ref 31.4–35)
MCV RBC AUTO: 76.6 FL (ref 82–102)
MONOCYTES # BLD: 0.3 K/UL (ref 0.1–1.3)
MONOCYTES NFR BLD: 6 % (ref 4–12)
NEUTS SEG # BLD: 2.5 K/UL (ref 1.7–8.2)
NEUTS SEG NFR BLD: 50 % (ref 43–78)
NRBC # BLD: 0 K/UL (ref 0–0.2)
PLATELET # BLD AUTO: 111 K/UL (ref 150–450)
PMV BLD AUTO: 13.5 FL (ref 9.4–12.3)
POTASSIUM SERPL-SCNC: 3.9 MMOL/L (ref 3.5–5.1)
PROT SERPL-MCNC: 7.3 G/DL (ref 6.3–8.2)
RBC # BLD AUTO: 4.83 M/UL (ref 4.05–5.2)
SODIUM SERPL-SCNC: 141 MMOL/L (ref 136–146)
WBC # BLD AUTO: 5 K/UL (ref 4.3–11.1)

## 2024-03-28 PROCEDURE — 99215 OFFICE O/P EST HI 40 MIN: CPT | Performed by: PEDIATRICS

## 2024-03-28 PROCEDURE — 36415 COLL VENOUS BLD VENIPUNCTURE: CPT

## 2024-03-28 PROCEDURE — 85025 COMPLETE CBC W/AUTO DIFF WBC: CPT

## 2024-03-28 PROCEDURE — 80053 COMPREHEN METABOLIC PANEL: CPT

## 2024-03-28 RX ORDER — LABETALOL 100 MG/1
100 TABLET, FILM COATED ORAL 2 TIMES DAILY
COMMUNITY
Start: 2024-03-27

## 2024-03-28 NOTE — PATIENT INSTRUCTIONS
Patient Instructions from Today's Visit    Reason for Visit:  Follow up for low platelets in pregnancy   S/p delivery: Nov 2023  Currently breastfeeding and not having menstruation    Plan:    Your platelets are low but improved from last check. He still thinks this is consistent with ITP. No intervention at this level.     Your hemoglobin is mildly low.   We will check your iron stores next time.     Follow Up:  6 months     Recent Lab Results:  Hospital Outpatient Visit on 03/28/2024   Component Date Value Ref Range Status    Sodium 03/28/2024 141  136 - 146 mmol/L Final    Potassium 03/28/2024 3.9  3.5 - 5.1 mmol/L Final    Chloride 03/28/2024 110  103 - 113 mmol/L Final    CO2 03/28/2024 29  21 - 32 mmol/L Final    Anion Gap 03/28/2024 2  2 - 11 mmol/L Final    Glucose 03/28/2024 122 (H)  65 - 100 mg/dL Final    BUN 03/28/2024 11  6 - 23 MG/DL Final    Creatinine 03/28/2024 0.90  0.6 - 1.0 MG/DL Final    Est, Glom Filt Rate 03/28/2024 89  >60 ml/min/1.73m2 Final    Comment:    Pediatric calculator link: https://www.kidney.org/professionals/kdoqi/gfr_calculatorped     These results are not intended for use in patients <18 years of age.     eGFR results are calculated without a race factor using  the 2021 CKD-EPI equation. Careful clinical correlation is recommended, particularly when comparing to results calculated using previous equations.  The CKD-EPI equation is less accurate in patients with extremes of muscle mass, extra-renal metabolism of creatinine, excessive creatine ingestion, or following therapy that affects renal tubular secretion.      Calcium 03/28/2024 9.0  8.3 - 10.4 MG/DL Final    Total Bilirubin 03/28/2024 0.4  0.2 - 1.1 MG/DL Final    ALT 03/28/2024 24  12 - 65 U/L Final    AST 03/28/2024 23  15 - 37 U/L Final    Alk Phosphatase 03/28/2024 73  50 - 136 U/L Final    Total Protein 03/28/2024 7.3  6.3 - 8.2 g/dL Final    Albumin 03/28/2024 3.8  3.5 - 5.0 g/dL Final    Globulin 03/28/2024 3.5  2.8

## 2024-03-28 NOTE — PROGRESS NOTES
HISTORY OF PRESENT ILLNESS  micheal is a 28 y.o. y.o. female with thrombocytopenia      ABSTRACT  Referral Diagnosis: Thrombocytopenia     Referring Provider: Deanna Stein MD     Primary Care Provider: Mimi Guzman MD     Family History of Cancer/ Hematology Disorders: Family history significant for maternal aunt with breast cancer.     Presenting Symptoms: Thrombocytopenia     Chronological History of Pertinent Events:      28-year-old black female     PMH: GENARO, renal disorder,      23 - Met with Gynecologist - [facility]   at 11w6d by 726d  c/o nausea; denies vomiting; c/o fatigue  Denies bleeding or pain  c/o irregular periods  Platelets at 132  Discussed plan: patient requesting bilateral salpingectomy vs tubal after delivery.     23 - Met with Gynecologist - [facility]  OB Workup; 12 wks 5 days  Platelet precautions discussed.  Hematology and MFM referrals placed.     7/3/23 - Met with Gynecologist - [facility]  OB Workup: 20 wks 3 days   Platelets at 132     23 - Met with Gynecologist - [facility]  OB Workup: 28 wks 3 days  Here for routine OV  Patient has a history of thrombocytopenia during her previous pregnancies (8/1/15 and 3/1/21 FT births)  Platelets at 98     A referral has been placed with BS for a Medical Hematology consultation and treatment.        ([facility])       7/3/23 - [facility]       23 - CBC - [facility]            Notes from Referring Provider: N/A  HPI: 33 w with h/o ITP  Plts normal outside of preg  No blood in past  No iron in past  No active bleeding  Gest diabetes      Patient Denies:   Fevers   Night Sweats   Chills   Weight Loss   Bone Pain   Lymphadenopathy  Patient Denies:  Nose bleeds  Gum bleeds  Bruising or petechia  Bleeding with surgery  Bleeding with accidents  Transfusions  History or free bleeding or hemophilia    Current Outpatient Medications   Medication Sig    ONETOUCH VERIO strip USE AS DIRECTED. TO CHECK BLOOD SUGARS

## 2024-09-16 NOTE — ED NOTES
I have reviewed discharge instructions with the patient. The patient verbalized understanding. Patient left ED via Discharge Method: ambulatory to Home with (self). Opportunity for questions and clarification provided. Patient given 1 scripts. To continue your aftercare when you leave the hospital, you may receive an automated call from our care team to check in on how you are doing. This is a free service and part of our promise to provide the best care and service to meet your aftercare needs.  If you have questions, or wish to unsubscribe from this service please call [phone]. Thank you for Choosing our [facility].
This rn explained the pain scale to pt. .. Pt sts her pain is a 10 in her throat. pt drove herself to the er.
normal/well-groomed/no distress

## 2024-10-17 ENCOUNTER — TELEPHONE (OUTPATIENT)
Dept: ONCOLOGY | Age: 29
End: 2024-10-17

## 2024-12-03 ENCOUNTER — HOSPITAL ENCOUNTER (OUTPATIENT)
Dept: LAB | Age: 29
Discharge: HOME | End: 2024-12-03
Payer: COMMERCIAL

## 2024-12-03 ENCOUNTER — OFFICE VISIT (OUTPATIENT)
Dept: ONCOLOGY | Age: 29
End: 2024-12-03
Payer: COMMERCIAL

## 2024-12-03 VITALS
SYSTOLIC BLOOD PRESSURE: 123 MMHG | BODY MASS INDEX: 28.32 KG/M2 | HEART RATE: 69 BPM | DIASTOLIC BLOOD PRESSURE: 83 MMHG | TEMPERATURE: 98.5 F | HEIGHT: 62 IN | WEIGHT: 153.9 LBS | RESPIRATION RATE: 12 BRPM | OXYGEN SATURATION: 96 %

## 2024-12-03 DIAGNOSIS — O99.119 THROMBOCYTOPENIA COMPLICATING PREGNANCY (HCC): Primary | ICD-10-CM

## 2024-12-03 DIAGNOSIS — Z86.2 HISTORY OF ITP: ICD-10-CM

## 2024-12-03 DIAGNOSIS — N92.0 MENORRHAGIA WITH REGULAR CYCLE: ICD-10-CM

## 2024-12-03 DIAGNOSIS — D69.6 THROMBOCYTOPENIA COMPLICATING PREGNANCY (HCC): Primary | ICD-10-CM

## 2024-12-03 DIAGNOSIS — Z86.39 HX OF IRON DEFICIENCY: ICD-10-CM

## 2024-12-03 LAB
ALBUMIN SERPL-MCNC: 4.3 G/DL (ref 3.5–5)
ALBUMIN/GLOB SERPL: 1.2 (ref 1–1.9)
ALP SERPL-CCNC: 63 U/L (ref 35–104)
ALT SERPL-CCNC: 8 U/L (ref 8–45)
ANION GAP SERPL CALC-SCNC: 11 MMOL/L (ref 7–16)
AST SERPL-CCNC: 21 U/L (ref 15–37)
BASOPHILS # BLD: 0 K/UL (ref 0–0.2)
BASOPHILS NFR BLD: 0 % (ref 0–2)
BILIRUB SERPL-MCNC: 0.5 MG/DL (ref 0–1.2)
BUN SERPL-MCNC: 12 MG/DL (ref 6–23)
CALCIUM SERPL-MCNC: 9.3 MG/DL (ref 8.8–10.2)
CHLORIDE SERPL-SCNC: 104 MMOL/L (ref 98–107)
CO2 SERPL-SCNC: 25 MMOL/L (ref 20–29)
CREAT SERPL-MCNC: 0.87 MG/DL (ref 0.6–1.1)
DIFFERENTIAL METHOD BLD: ABNORMAL
EOSINOPHIL # BLD: 0.1 K/UL (ref 0–0.8)
EOSINOPHIL NFR BLD: 2 % (ref 0.5–7.8)
ERYTHROCYTE [DISTWIDTH] IN BLOOD BY AUTOMATED COUNT: 14.7 % (ref 11.9–14.6)
FERRITIN SERPL-MCNC: 31 NG/ML (ref 8–388)
GLOBULIN SER CALC-MCNC: 3.6 G/DL (ref 2.3–3.5)
GLUCOSE SERPL-MCNC: 93 MG/DL (ref 70–99)
HCT VFR BLD AUTO: 40.5 % (ref 35.8–46.3)
HGB BLD-MCNC: 12.2 G/DL (ref 11.7–15.4)
HGB RETIC QN AUTO: 26 PG (ref 29–35)
IMM GRANULOCYTES # BLD AUTO: 0 K/UL (ref 0–0.5)
IMM GRANULOCYTES NFR BLD AUTO: 0 % (ref 0–5)
IMM RETICS NFR: 12.7 % (ref 3–15.9)
IRON SATN MFR SERPL: 34 % (ref 20–50)
IRON SERPL-MCNC: 128 UG/DL (ref 35–100)
LYMPHOCYTES # BLD: 2.7 K/UL (ref 0.5–4.6)
LYMPHOCYTES NFR BLD: 47 % (ref 13–44)
MCH RBC QN AUTO: 23.5 PG (ref 26.1–32.9)
MCHC RBC AUTO-ENTMCNC: 30.1 G/DL (ref 31.4–35)
MCV RBC AUTO: 77.9 FL (ref 82–102)
MONOCYTES # BLD: 0.4 K/UL (ref 0.1–1.3)
MONOCYTES NFR BLD: 7 % (ref 4–12)
NEUTS SEG # BLD: 2.5 K/UL (ref 1.7–8.2)
NEUTS SEG NFR BLD: 44 % (ref 43–78)
NRBC # BLD: 0 K/UL (ref 0–0.2)
PLATELET # BLD AUTO: 146 K/UL (ref 150–450)
PLATELET COMMENT: ADEQUATE
PMV BLD AUTO: ABNORMAL FL (ref 9.4–12.3)
POTASSIUM SERPL-SCNC: 3.7 MMOL/L (ref 3.5–5.1)
PROT SERPL-MCNC: 8 G/DL (ref 6.3–8.2)
RBC # BLD AUTO: 5.2 M/UL (ref 4.05–5.2)
RBC MORPH BLD: ABNORMAL
RBC MORPH BLD: ABNORMAL
RETICS # AUTO: 0.11 M/UL (ref 0.03–0.1)
RETICS/RBC NFR AUTO: 2.2 % (ref 0.3–2)
SODIUM SERPL-SCNC: 140 MMOL/L (ref 136–145)
TIBC SERPL-MCNC: 375 UG/DL (ref 240–450)
UIBC SERPL-MCNC: 247 UG/DL (ref 112–347)
WBC # BLD AUTO: 5.7 K/UL (ref 4.3–11.1)
WBC MORPH BLD: ABNORMAL

## 2024-12-03 PROCEDURE — 85025 COMPLETE CBC W/AUTO DIFF WBC: CPT

## 2024-12-03 PROCEDURE — 83540 ASSAY OF IRON: CPT

## 2024-12-03 PROCEDURE — 99214 OFFICE O/P EST MOD 30 MIN: CPT | Performed by: PEDIATRICS

## 2024-12-03 PROCEDURE — 80053 COMPREHEN METABOLIC PANEL: CPT

## 2024-12-03 PROCEDURE — 82728 ASSAY OF FERRITIN: CPT

## 2024-12-03 PROCEDURE — 85046 RETICYTE/HGB CONCENTRATE: CPT

## 2024-12-03 PROCEDURE — 83550 IRON BINDING TEST: CPT

## 2024-12-03 PROCEDURE — 36415 COLL VENOUS BLD VENIPUNCTURE: CPT

## 2024-12-03 ASSESSMENT — PATIENT HEALTH QUESTIONNAIRE - PHQ9
SUM OF ALL RESPONSES TO PHQ QUESTIONS 1-9: 0
SUM OF ALL RESPONSES TO PHQ QUESTIONS 1-9: 0
1. LITTLE INTEREST OR PLEASURE IN DOING THINGS: NOT AT ALL
SUM OF ALL RESPONSES TO PHQ9 QUESTIONS 1 & 2: 0
SUM OF ALL RESPONSES TO PHQ QUESTIONS 1-9: 0
2. FEELING DOWN, DEPRESSED OR HOPELESS: NOT AT ALL
SUM OF ALL RESPONSES TO PHQ QUESTIONS 1-9: 0

## 2024-12-03 NOTE — PROGRESS NOTES
recent):  No results found for this or any previous visit from the past 365 days.    US  No results found for this or any previous visit from the past 365 days.         Patient Active Problem List   Diagnosis    History of pre-eclampsia    Thrombocytopenia complicating pregnancy (HCC)    Obesity affecting pregnancy in third trimester    Diet controlled gestational diabetes mellitus (GDM) in third trimester    High risk multigravida in third trimester    Gestational hypertension without significant proteinuria during pregnancy in third trimester, antepartum    Gestational htn w/o significant proteinuria, third trimester    HELLP syndrome (HELLP), third trimester    Maternal care for uterine scar due to previous  section, delivered    Post-op pain    Delivery of pregnancy by  section         ASSESSMENT and PLAN  29 y.o. with a history thrombocytopenia in pregnancy with no active bleeding and no known etiology  Thrombocytopenia in pregnancy is most commonly caused by mild dilutional effect, gestational thrombocytopenia which resolves by 3rd trimester most often and ITP which shows progressive decline or stably low platelets through 3rd trimester.  Mild thromboctyopenia (100-150k) is not harmful to mom or baby and no restrictions or modifications are required. Treatment is potentially indicated if c/s delivery and especially regional anesthesia is needed, usually to keep plts above 80k or per OB/anesthesia preference so careful team based approach is required.    1) Thrombocytopenia in pregnancy  -gestational thromboctyopenia versus ITP  -maintain plts above 30k until proximal to delivery and if regional anesthesia is required/desired, treat to maintain above 80k or as high as possible, treatment specifics to be addressed  -monthly follow up with heme labs/pe  -monitor for other causes, preeclampsia, HELLP, fatty liver pregnancy, aHUS and others  -continue careful OB and/or MFM follow up  2) GENARO  -given

## 2024-12-03 NOTE — PATIENT INSTRUCTIONS
Patient Information from Today's Visit    Diagnosis:   Follow up for low platelets   Hx heavy periods     Follow Up Instructions:     Follow up in 1 year   If your platelets remain nearly normal Dr Hay said he may release you to your PCP at that time.       Treatment Summary has been discussed and given to patient: N/A      Current Labs:   Hospital Outpatient Visit on 12/03/2024   Component Date Value Ref Range Status    WBC 12/03/2024 5.7  4.3 - 11.1 K/uL Final    Comment: RESULTS CHECKED X 2  PERIPHERAL REVIEW TO FOLLOW      RBC 12/03/2024 5.20  4.05 - 5.2 M/uL Final    Hemoglobin 12/03/2024 12.2  11.7 - 15.4 g/dL Final    Hematocrit 12/03/2024 40.5  35.8 - 46.3 % Final    MCV 12/03/2024 77.9 (L)  82.0 - 102.0 FL Final    MCH 12/03/2024 23.5 (L)  26.1 - 32.9 PG Final    MCHC 12/03/2024 30.1 (L)  31.4 - 35.0 g/dL Final    RDW 12/03/2024 14.7 (H)  11.9 - 14.6 % Final    Platelets 12/03/2024 146 (L)  150 - 450 K/uL Final    MPV 12/03/2024 Unable to calculate. Recommend adding IPF.  9.4 - 12.3 FL Final    nRBC 12/03/2024 0.00  0.0 - 0.2 K/uL Final    **Note: Absolute NRBC parameter is now reported with Hemogram**    Differential Type 12/03/2024 PENDING   Incomplete    Reticulocyte Count,Automated 12/03/2024 2.2 (H)  0.3 - 2.0 % Final    Absolute Retic # 12/03/2024 0.1134 (H)  0.026 - 0.095 M/ul Final    Immature Retic Fraction 12/03/2024 12.7  3.0 - 15.9 % Final    Retic Hemoglobin conc. 12/03/2024 26 (L)  29 - 35 pg Final           Please refer to After Visit Summary or [employer] for upcoming appointment information. If you have any questions regarding your upcoming schedule please reach out to your care team through [employer] or call [phone].    Please notify your assigned Nurse Navigator of any unplanned hospital admissions or Emergency Room visits within 24 hours of discharge.    -------------------------------------------------------------------------------------------------------------------  Please call

## 2025-08-26 ENCOUNTER — TELEPHONE (OUTPATIENT)
Dept: ONCOLOGY | Age: 30
End: 2025-08-26

## (undated) DEVICE — SURGICAL PROCEDURE PACK C SECT CDS

## (undated) DEVICE — ELECTRODE PT RET AD L9FT HI MOIST COND ADH HYDRGEL CORDED

## (undated) DEVICE — TUBING, SUCTION, 1/4" X 10', STRAIGHT: Brand: MEDLINE

## (undated) DEVICE — SUTURE MCRYL SZ 0 L36IN ABSRB UD L36MM CT-1 1/2 CIR Y946H

## (undated) DEVICE — SEALER ENDOSCP NANO COAT OPN DIV CRV L JAW LIGASURE IMPACT

## (undated) DEVICE — APPLICATOR MEDICATED 26 CC SOLUTION HI LT ORNG CHLORAPREP

## (undated) DEVICE — SUTURE VCRL SZ 0 L36IN ABSRB UD L36MM CT-1 1/2 CIR J946H

## (undated) DEVICE — SOLUTION IRRIG 1000ML H2O STRL BLT

## (undated) DEVICE — SUTURE 2-0 L36IN ABSRB BRN CT L40MM 1/2 CIR TAPERPOINT SGL 913H

## (undated) DEVICE — SOLUTION IV 1000ML 0.9% SOD CHL

## (undated) DEVICE — SUTURE MCRYL SZ 4-0 L27IN ABSRB UD L19MM PS-2 1/2 CIR PRIM Y426H

## (undated) DEVICE — KIT URIN CATH L16FR BLLN 5ML INDWL STR TIP INF CTRL URIN

## (undated) DEVICE — APPLICATOR BNDG 1MM ADH PREMIERPRO EXOFIN

## (undated) DEVICE — KENDALL SCD EXPRESS SLEEVES, KNEE LENGTH, MEDIUM: Brand: KENDALL SCD

## (undated) DEVICE — STERILE POLYISOPRENE POWDER-FREE SURGICAL GLOVES: Brand: PROTEXIS

## (undated) DEVICE — PENCIL ES L3M BTTN SWCH S STL HEX LOK BLDE ELECTRD HOLSTER

## (undated) DEVICE — AGENT HEMSTAT 3GM OXIDIZED REGENERATED CELOS ABSRB FOR CONT (ORDER MULTIPLES OF 5EA)

## (undated) DEVICE — Device: Brand: PORTEX